# Patient Record
Sex: FEMALE | Race: OTHER | Employment: UNEMPLOYED | URBAN - METROPOLITAN AREA
[De-identification: names, ages, dates, MRNs, and addresses within clinical notes are randomized per-mention and may not be internally consistent; named-entity substitution may affect disease eponyms.]

---

## 2017-08-13 ENCOUNTER — HOSPITAL ENCOUNTER (EMERGENCY)
Facility: HOSPITAL | Age: 22
Discharge: HOME | End: 2017-08-13
Attending: EMERGENCY MEDICINE
Payer: COMMERCIAL

## 2017-08-13 VITALS
OXYGEN SATURATION: 99 % | RESPIRATION RATE: 16 BRPM | HEART RATE: 67 BPM | TEMPERATURE: 98 F | SYSTOLIC BLOOD PRESSURE: 107 MMHG | WEIGHT: 110 LBS | DIASTOLIC BLOOD PRESSURE: 71 MMHG

## 2017-08-13 DIAGNOSIS — R41.82 ALTERED MENTAL STATUS, UNSPECIFIED ALTERED MENTAL STATUS TYPE: Primary | ICD-10-CM

## 2017-08-13 LAB
ALBUMIN SERPL-MCNC: 4.2 G/DL (ref 3.5–4.8)
ALP LIVER SERPL-CCNC: 49 U/L (ref 52–144)
ALT SERPL-CCNC: 18 U/L (ref 14–54)
AMPHETAMINE URINE: NEGATIVE
AST SERPL-CCNC: 16 U/L (ref 15–41)
BARBITURATES URINE: NEGATIVE
BASOPHILS # BLD AUTO: 0.03 X10(3) UL (ref 0–0.1)
BASOPHILS NFR BLD AUTO: 0.3 %
BENZODIAZEPINES URINE: NEGATIVE
BILIRUB SERPL-MCNC: 0.4 MG/DL (ref 0.1–2)
BILIRUB UR QL STRIP.AUTO: NEGATIVE
BUN BLD-MCNC: 10 MG/DL (ref 8–20)
CALCIUM BLD-MCNC: 9.4 MG/DL (ref 8.3–10.3)
CANNABINOID URINE: NEGATIVE
CHLORIDE: 109 MMOL/L (ref 101–111)
CLARITY UR REFRACT.AUTO: CLEAR
CO2: 23 MMOL/L (ref 22–32)
COCAINE URINE: NEGATIVE
CREAT BLD-MCNC: 0.72 MG/DL (ref 0.55–1.02)
EOSINOPHIL # BLD AUTO: 0.04 X10(3) UL (ref 0–0.3)
EOSINOPHIL NFR BLD AUTO: 0.4 %
ERYTHROCYTE [DISTWIDTH] IN BLOOD BY AUTOMATED COUNT: 11.6 % (ref 11.5–16)
ETHYL ALCOHOL: <3 MG/DL (ref ?–3)
EXPIRATION DATE: NORMAL
GLUCOSE BLD-MCNC: 103 MG/DL (ref 70–99)
GLUCOSE UR STRIP.AUTO-MCNC: NEGATIVE MG/DL
HCT VFR BLD AUTO: 40.7 % (ref 34–50)
HGB BLD-MCNC: 14.2 G/DL (ref 12–16)
IMMATURE GRANULOCYTE COUNT: 0.03 X10(3) UL (ref 0–1)
IMMATURE GRANULOCYTE RATIO %: 0.3 %
LEUKOCYTE ESTERASE UR QL STRIP.AUTO: NEGATIVE
LYMPHOCYTES # BLD AUTO: 2.21 X10(3) UL (ref 0.9–4)
LYMPHOCYTES NFR BLD AUTO: 22 %
M PROTEIN MFR SERPL ELPH: 8 G/DL (ref 6.1–8.3)
MCH RBC QN AUTO: 32.6 PG (ref 27–33.2)
MCHC RBC AUTO-ENTMCNC: 34.9 G/DL (ref 31–37)
MCV RBC AUTO: 93.3 FL (ref 81–100)
MONOCYTES # BLD AUTO: 0.6 X10(3) UL (ref 0.1–0.6)
MONOCYTES NFR BLD AUTO: 6 %
NEUTROPHIL ABS PRELIM: 7.14 X10 (3) UL (ref 1.3–6.7)
NEUTROPHILS # BLD AUTO: 7.14 X10(3) UL (ref 1.3–6.7)
NEUTROPHILS NFR BLD AUTO: 71 %
NITRITE UR QL STRIP.AUTO: NEGATIVE
OPIATE URINE: NEGATIVE
PCP URINE: NEGATIVE
PH UR STRIP.AUTO: 8 [PH] (ref 4.5–8)
PLATELET # BLD AUTO: 228 10(3)UL (ref 150–450)
POCT LOT NUMBER: NORMAL
POCT URINE PREGNANCY: NEGATIVE
POTASSIUM SERPL-SCNC: 3.5 MMOL/L (ref 3.6–5.1)
PROT UR STRIP.AUTO-MCNC: NEGATIVE MG/DL
RBC # BLD AUTO: 4.36 X10(6)UL (ref 3.8–5.1)
RBC UR QL AUTO: NEGATIVE
RED CELL DISTRIBUTION WIDTH-SD: 39.1 FL (ref 35.1–46.3)
SODIUM SERPL-SCNC: 142 MMOL/L (ref 136–144)
SP GR UR STRIP.AUTO: 1.01 (ref 1–1.03)
TROPONIN: <0.046 NG/ML (ref ?–0.05)
UROBILINOGEN UR STRIP.AUTO-MCNC: <2 MG/DL
WBC # BLD AUTO: 10.1 X10(3) UL (ref 4–13)

## 2017-08-13 PROCEDURE — 81003 URINALYSIS AUTO W/O SCOPE: CPT | Performed by: EMERGENCY MEDICINE

## 2017-08-13 PROCEDURE — 80053 COMPREHEN METABOLIC PANEL: CPT | Performed by: EMERGENCY MEDICINE

## 2017-08-13 PROCEDURE — 80320 DRUG SCREEN QUANTALCOHOLS: CPT | Performed by: EMERGENCY MEDICINE

## 2017-08-13 PROCEDURE — 99284 EMERGENCY DEPT VISIT MOD MDM: CPT

## 2017-08-13 PROCEDURE — 93010 ELECTROCARDIOGRAM REPORT: CPT

## 2017-08-13 PROCEDURE — 80307 DRUG TEST PRSMV CHEM ANLYZR: CPT | Performed by: EMERGENCY MEDICINE

## 2017-08-13 PROCEDURE — 84484 ASSAY OF TROPONIN QUANT: CPT | Performed by: EMERGENCY MEDICINE

## 2017-08-13 PROCEDURE — 93005 ELECTROCARDIOGRAM TRACING: CPT

## 2017-08-13 PROCEDURE — 85025 COMPLETE CBC W/AUTO DIFF WBC: CPT | Performed by: EMERGENCY MEDICINE

## 2017-08-13 PROCEDURE — 96360 HYDRATION IV INFUSION INIT: CPT

## 2017-08-13 PROCEDURE — 81025 URINE PREGNANCY TEST: CPT

## 2017-08-13 NOTE — ED NOTES
Family at the bedside, patient alert and communicating with family and staff.  She states \" I fell asleep and woke up numb, my mouth was numb so I couldn't talk\"

## 2017-08-13 NOTE — ED PROVIDER NOTES
Patient Seen in: [facility] Emergency Department    History   Patient presents with:  Numbness Weakness (neurologic)    Stated Complaint: weakness    HPI    The patient is a 26-year-old female brought in by her family due to \"crumpling up on the fl as noted above. PSFH elements reviewed from today and agreed except as otherwise stated in HPI.     Physical Exam   ED Triage Vitals [08/13/17 0223]  BP: 129/78  Pulse: 98  Resp: 22  Temp: 98.1 °F (36.7 °C)  Temp src: Temporal  SpO2: 99 %  O2 Device: Non normal limits   ETHYL ALCOHOL - Normal   DRUG SCREEN 7 W/OUT CONFIRMATION, URINE - Normal    Narrative:     Results of the Urine Drug Screen should be used only for medical purposes.    TROPONIN I - Normal   CBC WITH DIFFERENTIAL WITH PLATELET    Narrative: inversions however she is not having any chest pain or shortness of breath. Due to the T-wave inversions troponin was added on. The remainder of her blood work is reassuring.   She is observed in the emergency department for a couple of hours, and now she

## 2017-08-14 LAB
ATRIAL RATE: 86 BPM
P AXIS: 73 DEGREES
P-R INTERVAL: 128 MS
Q-T INTERVAL: 348 MS
QRS DURATION: 78 MS
QTC CALCULATION (BEZET): 416 MS
R AXIS: 29 DEGREES
T AXIS: 13 DEGREES
VENTRICULAR RATE: 86 BPM

## 2018-12-17 ENCOUNTER — OFFICE VISIT (OUTPATIENT)
Dept: FAMILY MEDICINE CLINIC | Facility: CLINIC | Age: 23
End: 2018-12-17
Payer: COMMERCIAL

## 2018-12-17 VITALS
BODY MASS INDEX: 24.55 KG/M2 | DIASTOLIC BLOOD PRESSURE: 70 MMHG | SYSTOLIC BLOOD PRESSURE: 110 MMHG | TEMPERATURE: 99 F | HEART RATE: 84 BPM | WEIGHT: 128.38 LBS | OXYGEN SATURATION: 97 % | HEIGHT: 60.63 IN

## 2018-12-17 DIAGNOSIS — R10.13 EPIGASTRIC PAIN: ICD-10-CM

## 2018-12-17 DIAGNOSIS — R20.2 NUMBNESS AND TINGLING OF BOTH LEGS: ICD-10-CM

## 2018-12-17 DIAGNOSIS — R51.9 HEADACHE DISORDER: ICD-10-CM

## 2018-12-17 DIAGNOSIS — R20.2 NUMBNESS AND TINGLING IN BOTH HANDS: Primary | ICD-10-CM

## 2018-12-17 DIAGNOSIS — R20.0 NUMBNESS AND TINGLING OF BOTH LEGS: ICD-10-CM

## 2018-12-17 DIAGNOSIS — R20.0 NUMBNESS AND TINGLING IN BOTH HANDS: Primary | ICD-10-CM

## 2018-12-17 PROCEDURE — 99204 OFFICE O/P NEW MOD 45 MIN: CPT | Performed by: FAMILY MEDICINE

## 2018-12-17 NOTE — PROGRESS NOTES
Ana Gordillo is a 26year old female here for Patient presents with:  Numbness: Leg and arm numbness x 2 weeks       HPI:       1. Numbness and tingling in both hands  2.  Numbness and tingling of both legs  -started 1 yr ago  -most recently, had another ep murmurs  Abd: soft, ND, NT, +BS  Ext: full ROM  Psych: normal affect  Neuro: CN II-XII intact, strength and sensation normal, normal gait, normal reflexes         ASSESSMENT/PLAN:     1. Numbness and tingling in both hands  2.  Numbness and tingling of both

## 2018-12-17 NOTE — PATIENT INSTRUCTIONS
-- start otc probiotics once a day for 1-2 months  -- - try generic ranitidine (otc) 30min before dinner (if symptoms during the day, can take medication 2x/day before breakfast and dinner)  --limit caffeine, spicy foods, alcohol, acidic foods (tomatoes,

## 2018-12-20 ENCOUNTER — LAB ENCOUNTER (OUTPATIENT)
Dept: LAB | Age: 23
End: 2018-12-20
Attending: FAMILY MEDICINE
Payer: COMMERCIAL

## 2018-12-20 DIAGNOSIS — R20.2 NUMBNESS AND TINGLING OF BOTH LEGS: ICD-10-CM

## 2018-12-20 DIAGNOSIS — R20.2 NUMBNESS AND TINGLING IN BOTH HANDS: ICD-10-CM

## 2018-12-20 DIAGNOSIS — R20.0 NUMBNESS AND TINGLING OF BOTH LEGS: ICD-10-CM

## 2018-12-20 DIAGNOSIS — R20.0 NUMBNESS AND TINGLING IN BOTH HANDS: ICD-10-CM

## 2018-12-20 PROCEDURE — 82607 VITAMIN B-12: CPT

## 2018-12-20 PROCEDURE — 82746 ASSAY OF FOLIC ACID SERUM: CPT

## 2018-12-20 PROCEDURE — 36415 COLL VENOUS BLD VENIPUNCTURE: CPT

## 2018-12-20 PROCEDURE — 80053 COMPREHEN METABOLIC PANEL: CPT

## 2018-12-20 PROCEDURE — 85025 COMPLETE CBC W/AUTO DIFF WBC: CPT

## 2018-12-20 PROCEDURE — 84443 ASSAY THYROID STIM HORMONE: CPT

## 2018-12-20 PROCEDURE — 82306 VITAMIN D 25 HYDROXY: CPT

## 2018-12-27 ENCOUNTER — TELEPHONE (OUTPATIENT)
Dept: FAMILY MEDICINE CLINIC | Facility: CLINIC | Age: 23
End: 2018-12-27

## 2018-12-27 DIAGNOSIS — E55.9 VITAMIN D DEFICIENCY: Primary | ICD-10-CM

## 2018-12-27 RX ORDER — ERGOCALCIFEROL 1.25 MG/1
50000 CAPSULE ORAL WEEKLY
Qty: 12 CAPSULE | Refills: 0 | Status: SHIPPED | OUTPATIENT
Start: 2018-12-27 | End: 2019-03-08

## 2019-01-07 ENCOUNTER — OFFICE VISIT (OUTPATIENT)
Dept: FAMILY MEDICINE CLINIC | Facility: CLINIC | Age: 24
End: 2019-01-07
Payer: COMMERCIAL

## 2019-01-07 VITALS
TEMPERATURE: 99 F | SYSTOLIC BLOOD PRESSURE: 120 MMHG | HEART RATE: 84 BPM | BODY MASS INDEX: 23.48 KG/M2 | WEIGHT: 126 LBS | HEIGHT: 61.42 IN | DIASTOLIC BLOOD PRESSURE: 78 MMHG

## 2019-01-07 DIAGNOSIS — E55.9 VITAMIN D DEFICIENCY: ICD-10-CM

## 2019-01-07 DIAGNOSIS — R10.13 EPIGASTRIC PAIN: ICD-10-CM

## 2019-01-07 DIAGNOSIS — R51.9 HEADACHE DISORDER: Primary | ICD-10-CM

## 2019-01-07 PROCEDURE — 99215 OFFICE O/P EST HI 40 MIN: CPT | Performed by: FAMILY MEDICINE

## 2019-01-07 RX ORDER — AMITRIPTYLINE HYDROCHLORIDE 25 MG/1
TABLET, FILM COATED ORAL
Qty: 30 TABLET | Refills: 2 | Status: SHIPPED | OUTPATIENT
Start: 2019-01-07 | End: 2019-02-15

## 2019-01-07 NOTE — PATIENT INSTRUCTIONS
-- start amitripytline 1/2 tab every night - if doing ok, can increase to 1 tab every night  -- continue probiotics once daily  -- continue vit D 1x/wk  -- followup with neurology next week as planned  -- come back here in 1 month

## 2019-02-15 ENCOUNTER — OFFICE VISIT (OUTPATIENT)
Dept: FAMILY MEDICINE CLINIC | Facility: CLINIC | Age: 24
End: 2019-02-15
Payer: COMMERCIAL

## 2019-02-15 VITALS
TEMPERATURE: 98 F | HEIGHT: 61.42 IN | DIASTOLIC BLOOD PRESSURE: 60 MMHG | SYSTOLIC BLOOD PRESSURE: 100 MMHG | WEIGHT: 123.13 LBS | OXYGEN SATURATION: 97 % | BODY MASS INDEX: 22.95 KG/M2 | HEART RATE: 86 BPM

## 2019-02-15 DIAGNOSIS — Z3A.01 LESS THAN 8 WEEKS GESTATION OF PREGNANCY: Primary | ICD-10-CM

## 2019-02-15 PROCEDURE — 99215 OFFICE O/P EST HI 40 MIN: CPT | Performed by: FAMILY MEDICINE

## 2019-02-15 NOTE — PROGRESS NOTES
Vidya High is a 26year old female here for Patient presents with:  Pregnancy: Patient took a pregnancy test at home yesterday with a positive result.  Patient wants Blood Pregnancy test.      HPI:     ? Pregnancy  LMP 1/11/19  [facility]  -f/u in 1 month, sooner prn    - OBG - INTERNAL          The patient is asked to return in 1 month. Orders This Visit:  No orders of the defined types were placed in this encounter.       Meds This Visit:  Requested Prescriptions      No pres

## 2019-02-15 NOTE — PATIENT INSTRUCTIONS
-- stop prescription vit D - start otc vit D 2000 units daily  -- start prenatal vitamins (take with food, at night)  -- consider talking to family  -- continue to talk to boyfriend    -- call to schedule appt with obgyn     -- if change your mind, con

## 2019-02-18 ENCOUNTER — TELEPHONE (OUTPATIENT)
Dept: OBGYN CLINIC | Facility: CLINIC | Age: 24
End: 2019-02-18

## 2019-02-18 NOTE — TELEPHONE ENCOUNTER
Called patient back; ; no significant history. Pt taking PNV; no other meds. Pt reports left sided chest pain x 3 days; rates 5/10. Pt denies SOB or recent illness. Pt advised to go to ER/Urgent care or PCP today. Patient verbalized understanding.

## 2019-02-18 NOTE — TELEPHONE ENCOUNTER
Patient calling to initiate prenatal care  LMP = 1/11/19  Patient is 7-8 weeks =3/1/19  Confirmation Ultrasound and Appointment scheduled on 3/8/19  Insurance Mary Brandon time to return phone call = anytime   Future Appointments   Date Time Pro

## 2019-03-07 NOTE — PROGRESS NOTES
[facility]  Obstetrics and Gynecology    Subjective:     Azalea Mcgregor is a 26year old  female presents with c/o secondary amenorrhea and positive pregnancy test. The patient was recommended to return for further evaluation.  The patient re advised to follow up to establish prenatal care   - SAB precautions provided   - d/w nausea and vomiting in pregnancy including vitamin B 6 and unisom     All of the findings and plan were discussed with the patient.   She notes understanding and agrees wit

## 2019-03-08 ENCOUNTER — ULTRASOUND ENCOUNTER (OUTPATIENT)
Dept: OBGYN CLINIC | Facility: CLINIC | Age: 24
End: 2019-03-08
Payer: COMMERCIAL

## 2019-03-08 ENCOUNTER — OFFICE VISIT (OUTPATIENT)
Dept: OBGYN CLINIC | Facility: CLINIC | Age: 24
End: 2019-03-08
Payer: COMMERCIAL

## 2019-03-08 VITALS
HEIGHT: 61 IN | SYSTOLIC BLOOD PRESSURE: 114 MMHG | BODY MASS INDEX: 22.69 KG/M2 | WEIGHT: 120.19 LBS | DIASTOLIC BLOOD PRESSURE: 64 MMHG

## 2019-03-08 DIAGNOSIS — N91.2 AMENORRHEA: ICD-10-CM

## 2019-03-08 DIAGNOSIS — Z32.01 PREGNANCY EXAMINATION OR TEST, POSITIVE RESULT: Primary | ICD-10-CM

## 2019-03-08 PROCEDURE — 99203 OFFICE O/P NEW LOW 30 MIN: CPT | Performed by: OBSTETRICS & GYNECOLOGY

## 2019-03-08 PROCEDURE — 76801 OB US < 14 WKS SINGLE FETUS: CPT | Performed by: OBSTETRICS & GYNECOLOGY

## 2019-03-08 RX ORDER — PRENATAL VIT/IRON FUM/FOLIC AC 27MG-0.8MG
1 TABLET ORAL DAILY
COMMUNITY
End: 2021-01-11 | Stop reason: ALTCHOICE

## 2019-03-08 NOTE — PATIENT INSTRUCTIONS
Genetic screening     1) First trimester screening at 13 weeks includes blood test and ultrasound. You will need to schedule an appointment with maternal fetal medicine office. Therefore, you will need time to make appointment.      2) cell free fetal DNA ( Headache or Mild aches and pain: Extra Strength Tylenol     Gas: Gas X, Gelusil, Papaya Tablets, Maalox, Mylicon or Mylanta Gas    Heartburn: Tums, Mylanta, Pepcid AC, Zantac 75 or Maalox    Sore throat: Alcohol free Chloraseptic spray or Lozenges     Shelbieus

## 2019-03-15 ENCOUNTER — TELEPHONE (OUTPATIENT)
Dept: OBGYN CLINIC | Facility: CLINIC | Age: 24
End: 2019-03-15

## 2019-03-15 NOTE — TELEPHONE ENCOUNTER
Spoke with sister of patient. Advised her we messaged her sister earlier this morning with an available appt for today. She did not message back as of yet. Offered appt at 2:00 with Sasha Moore in [place] today. Sister of pt states that office is too far.  Flora Zheng

## 2019-03-15 NOTE — TELEPHONE ENCOUNTER
Pt sister called [place], her sister is ob and has her new ob appt ob wed, 3/27 at 11:30 with [facility]. She is worried about her sister, she is hardly not eating at all, always throwing up all day long, no energy at all and sleeping a lot.

## 2019-03-26 ENCOUNTER — PATIENT MESSAGE (OUTPATIENT)
Dept: OBGYN CLINIC | Facility: CLINIC | Age: 24
End: 2019-03-26

## 2019-03-26 RX ORDER — METOCLOPRAMIDE 10 MG/1
10 TABLET ORAL EVERY 6 HOURS PRN
Qty: 20 TABLET | Refills: 0 | Status: SHIPPED | OUTPATIENT
Start: 2019-03-26 | End: 2019-04-01

## 2019-03-26 NOTE — TELEPHONE ENCOUNTER
Patient was seen for  on 03/08/19. At that time she had c/o N/V and was advised to use OTC Vitamin B6 and Unisom. Patient has been taking since that time with no relief. She vomits multiple times and is unable to keep fluids down.  Reports some dizziness

## 2019-03-26 NOTE — TELEPHONE ENCOUNTER
From: Vidya High  To: Mariana Good MD  Sent: 3/26/2019 1:02 PM CDT  Subject: Other    Yes sandrine been taking them but they didnt help. And no i can't keep fluids down. And there is days sandrine been dizzy.

## 2019-03-27 ENCOUNTER — TELEPHONE (OUTPATIENT)
Dept: OBGYN CLINIC | Facility: CLINIC | Age: 24
End: 2019-03-27

## 2019-04-01 ENCOUNTER — TELEPHONE (OUTPATIENT)
Dept: OBGYN CLINIC | Facility: CLINIC | Age: 24
End: 2019-04-01

## 2019-04-01 ENCOUNTER — APPOINTMENT (OUTPATIENT)
Dept: ULTRASOUND IMAGING | Facility: HOSPITAL | Age: 24
End: 2019-04-01
Attending: EMERGENCY MEDICINE
Payer: COMMERCIAL

## 2019-04-01 ENCOUNTER — HOSPITAL ENCOUNTER (EMERGENCY)
Facility: HOSPITAL | Age: 24
Discharge: HOME | End: 2019-04-01
Attending: EMERGENCY MEDICINE
Payer: COMMERCIAL

## 2019-04-01 VITALS
SYSTOLIC BLOOD PRESSURE: 105 MMHG | HEIGHT: 61 IN | RESPIRATION RATE: 18 BRPM | WEIGHT: 108 LBS | HEART RATE: 91 BPM | DIASTOLIC BLOOD PRESSURE: 73 MMHG | OXYGEN SATURATION: 99 % | BODY MASS INDEX: 20.39 KG/M2 | TEMPERATURE: 98 F

## 2019-04-01 DIAGNOSIS — O21.0 HYPEREMESIS GRAVIDARUM: Primary | ICD-10-CM

## 2019-04-01 PROCEDURE — 76700 US EXAM ABDOM COMPLETE: CPT | Performed by: EMERGENCY MEDICINE

## 2019-04-01 RX ORDER — METOCLOPRAMIDE HYDROCHLORIDE 5 MG/ML
5 INJECTION INTRAMUSCULAR; INTRAVENOUS ONCE
Status: COMPLETED | OUTPATIENT
Start: 2019-04-01 | End: 2019-04-01

## 2019-04-01 RX ORDER — SODIUM CHLORIDE 9 MG/ML
1000 INJECTION, SOLUTION INTRAVENOUS ONCE
Status: COMPLETED | OUTPATIENT
Start: 2019-04-01 | End: 2019-04-01

## 2019-04-01 RX ORDER — DEXTROSE AND SODIUM CHLORIDE 5; .9 G/100ML; G/100ML
INJECTION, SOLUTION INTRAVENOUS ONCE
Status: COMPLETED | OUTPATIENT
Start: 2019-04-01 | End: 2019-04-01

## 2019-04-01 RX ORDER — METOCLOPRAMIDE 10 MG/1
5 TABLET ORAL EVERY 6 HOURS PRN
Qty: 16 TABLET | Refills: 0 | Status: SHIPPED | OUTPATIENT
Start: 2019-04-01 | End: 2019-04-17

## 2019-04-01 RX ORDER — DIPHENHYDRAMINE HYDROCHLORIDE 50 MG/ML
25 INJECTION INTRAMUSCULAR; INTRAVENOUS ONCE
Status: COMPLETED | OUTPATIENT
Start: 2019-04-01 | End: 2019-04-01

## 2019-04-01 NOTE — ED NOTES
Pt states the pain she has now she has had for a week, was told at [facility] something was wrong with her gallbladder but she is unsure what. This is the same pain she had when she was admitted there recently.

## 2019-04-01 NOTE — TELEPHONE ENCOUNTER
Pt's sister is calling on behalf of pt  Sister state that the pt still can not keep anything down and is too weak to answer the phone to speak with anyone that is why sister is calling  We have no FYI on file for pt so not sure if this is ok to speak with

## 2019-04-01 NOTE — TELEPHONE ENCOUNTER
Spoke with patient. She states she has had nausea and vomiting she stated for 4 weeks. She is not able to keep any fluids down at all. She sounds very weak/soft on the phone. Also with dizziness.  She was in ER on Friday at [facility] and they gave her Reg

## 2019-04-01 NOTE — TELEPHONE ENCOUNTER
Cortes Billingsley rooming and cannot take call/all other nurses on phone/PT waiting on hold till nurse is available in another office

## 2019-04-01 NOTE — ED INITIAL ASSESSMENT (HPI)
Pt to ED with NV x a month. Pt is 11 weeks pregnant, has not yet seen OB. Pt states she hasn't kept much PO intake down since early March. Denies vaginal bleeding + right sided abd pain.

## 2019-04-01 NOTE — ED NOTES
Pt states she is unable to provide urine specimen at this time. Pt states she was at [facility] the other day for same complaints, admitted for 2 days.

## 2019-04-01 NOTE — TELEPHONE ENCOUNTER
G1/P 0 GA 11 3/7 wks by LMP of 1/11/19 patient. Last OV: 3/8/19 for confirmation of pregnancy appt with Dr. Karlos Aguirre   Pregnancy Complications: none      Call to emergency contact- pt's sister, [place].  She is not with patient currently; states she is conc

## 2019-04-02 ENCOUNTER — TELEPHONE (OUTPATIENT)
Dept: OBGYN CLINIC | Facility: CLINIC | Age: 24
End: 2019-04-02

## 2019-04-02 NOTE — TELEPHONE ENCOUNTER
Contacted patient regarding recent ER visit. Patient states she is not feeling any better. She has been seen in the ER twice (once at [facility]) in the last week. She is still not able to keep anything down.  She is taking Benadryl + Reglan combination bu

## 2019-04-02 NOTE — ED PROVIDER NOTES
Patient Seen in: [facility] Emergency Department    History   Patient presents with:  Nausea/Vomiting/Diarrhea (gastrointestinal)    Stated Complaint: nvdr    HPI    Patient is a 27-year-old female with nausea vomiting associate with early pregnancy rate and rhythm. Abdomen: Benign abdominal exam.  No focal tenderness. Soft  Extremities: No clubbing/cyanosis/edema. Skin: No rashes, no pallor  Neuro: Awake oriented ×3.   Nonfocal.  Good strength throughout    ED Course     Labs Reviewed   COMP METABO saline. Initial bicarb 13. Glucose of 80. A second liter was given. Was given Reglan and Benadryl. No vomiting here. Does have mildly elevated LFTs. Abdominal ultrasound shows gallbladder sludge without cholecystitis.     MDM   Patient received 2 L o

## 2019-04-02 NOTE — TELEPHONE ENCOUNTER
Advise to increase Reglan to 10 mg PO q 6 hours. Advise unisom 12.5 mg qhs and vitamin b6 100 mg PO daily. Advise small, frequent meals. Advise dry foods, non-diary. Sea bands - has to wear both bands. Delmis - chew, hard candy.    If not improving th

## 2019-04-02 NOTE — ED NOTES
Pt states she is feeling better, nausea subsided. 2nd bag fluids infusing wide per order. Pt aware awaiting US.

## 2019-04-02 NOTE — ED NOTES
Bedside report from Carrillo Soni RN.  Pt resting, she denies pain, denies n/v. Pt awaiting US, she denies need, call light in reach

## 2019-04-02 NOTE — ED NOTES
Pt returned from [address], she denies pain.  Pt ambulated to the bathroom, gait steady

## 2019-04-02 NOTE — ED NOTES
Report to Jossy Alex at this time. US now ready. Pt resting on cart, still unable to provide urine specimen.

## 2019-04-03 ENCOUNTER — HOSPITAL ENCOUNTER (INPATIENT)
Facility: HOSPITAL | Age: 24
LOS: 6 days | Discharge: HOME HEALTH | DRG: 832 | End: 2019-04-11
Attending: EMERGENCY MEDICINE | Admitting: HOSPITALIST
Payer: COMMERCIAL

## 2019-04-03 DIAGNOSIS — R79.89 ELEVATED LFTS: ICD-10-CM

## 2019-04-03 DIAGNOSIS — O21.0 HYPEREMESIS GRAVIDARUM: Primary | ICD-10-CM

## 2019-04-03 PROBLEM — E87.6 HYPOKALEMIA: Status: ACTIVE | Noted: 2019-04-03

## 2019-04-03 PROBLEM — E87.20 METABOLIC ACIDOSIS: Status: ACTIVE | Noted: 2019-04-03

## 2019-04-03 PROBLEM — E87.2 METABOLIC ACIDOSIS: Status: ACTIVE | Noted: 2019-04-03

## 2019-04-03 RX ORDER — DEXTROSE AND SODIUM CHLORIDE 5; .45 G/100ML; G/100ML
INJECTION, SOLUTION INTRAVENOUS ONCE
Status: COMPLETED | OUTPATIENT
Start: 2019-04-03 | End: 2019-04-03

## 2019-04-03 RX ORDER — METOCLOPRAMIDE HYDROCHLORIDE 5 MG/ML
5 INJECTION INTRAMUSCULAR; INTRAVENOUS ONCE
Status: COMPLETED | OUTPATIENT
Start: 2019-04-03 | End: 2019-04-03

## 2019-04-03 RX ORDER — DIPHENHYDRAMINE HYDROCHLORIDE 50 MG/ML
25 INJECTION INTRAMUSCULAR; INTRAVENOUS ONCE
Status: COMPLETED | OUTPATIENT
Start: 2019-04-03 | End: 2019-04-03

## 2019-04-04 PROCEDURE — 99220 INITIAL OBSERVATION CARE,LEVL III: CPT | Performed by: HOSPITALIST

## 2019-04-04 PROCEDURE — 90792 PSYCH DIAG EVAL W/MED SRVCS: CPT | Performed by: OTHER

## 2019-04-04 PROCEDURE — 99252 IP/OBS CONSLTJ NEW/EST SF 35: CPT | Performed by: OBSTETRICS & GYNECOLOGY

## 2019-04-04 RX ORDER — METOCLOPRAMIDE HYDROCHLORIDE 5 MG/ML
10 INJECTION INTRAMUSCULAR; INTRAVENOUS EVERY 8 HOURS PRN
Status: DISCONTINUED | OUTPATIENT
Start: 2019-04-04 | End: 2019-04-11

## 2019-04-04 RX ORDER — ONDANSETRON 4 MG/1
4 TABLET, ORALLY DISINTEGRATING ORAL EVERY 4 HOURS PRN
Qty: 30 TABLET | Refills: 0 | Status: SHIPPED | OUTPATIENT
Start: 2019-04-04 | End: 2019-04-26

## 2019-04-04 RX ORDER — ONDANSETRON 2 MG/ML
4 INJECTION INTRAMUSCULAR; INTRAVENOUS EVERY 6 HOURS PRN
Status: DISCONTINUED | OUTPATIENT
Start: 2019-04-04 | End: 2019-04-04

## 2019-04-04 RX ORDER — DEXTROSE AND SODIUM CHLORIDE 5; .9 G/100ML; G/100ML
INJECTION, SOLUTION INTRAVENOUS CONTINUOUS
Status: DISCONTINUED | OUTPATIENT
Start: 2019-04-04 | End: 2019-04-05

## 2019-04-04 RX ORDER — ONDANSETRON 2 MG/ML
4 INJECTION INTRAMUSCULAR; INTRAVENOUS EVERY 6 HOURS PRN
Status: DISCONTINUED | OUTPATIENT
Start: 2019-04-04 | End: 2019-04-05

## 2019-04-04 NOTE — PROGRESS NOTES
Hospitalist Follow-up Note    Patient seen and examined. Patient's nausea slightly improved. Still not interested in eating. Patient reports last time she ate was 4 weeks ago. Patient denies feeling depressed/anxious. Feels safe at home.  While patient may

## 2019-04-04 NOTE — PLAN OF CARE
GASTROINTESTINAL - ADULT    • Minimal or absence of nausea and vomiting Progressing        PT ADMITTED ALERT, ACCOMPANIED BY FAMILY, REFUSING TO TALK, HER SISTER STATES SHE HAS STOPPED TALKING 2 WEEKS AGO, 100% ON RA, GIVEN ZOFRAN,  IVF INFUSING, LABS IN A

## 2019-04-04 NOTE — H&P
[facility]  History and Physical     Verdie Son Patient Status:  Observation    1995 MRN [id number]   [facility] 0SW-A Attending Jaquelin King MD   Hosp Day # 0 PCP Torsten Foster MD     Chief Complaint: hyperemesis    Hi x 3.  HEENT: Normocephalic atraumatic. Moist mucous membranes. EOM-I. PERRLA. Anicteric. Neck: No lymphadenopathy. No JVD. No carotid bruits. Respiratory: Clear to auscultation bilaterally. No wheezes. No rhonchi.   Cardiovascular: S1, S2. Regular rate an

## 2019-04-04 NOTE — ED PROVIDER NOTES
Patient Seen in: [facility] Emergency Department    History   Patient presents with:  Nausea/Vomiting/Diarrhea (gastrointestinal)    Stated Complaint: hyperemesis, almost 3 mos pregnant    HPI    Anjlai Schlatter is a 31-year-old female who presents today for atraumatic. Right Ear: External ear normal.   Left Ear: External ear normal.   Nose: Nose normal.   Mouth/Throat: Oropharynx is clear and moist.   Eyes: Conjunctivae and EOM are normal. Pupils are equal, round, and reactive to light.    Cardiovascular: No gravidarum  Plan: This is the patient's second visit to the emergency department in the past 48 hours. She will likely need admission for continued IV fluid treatment.   Dr. Corrine Qureshi is assuming care and will determine final disposition based on the labs t

## 2019-04-04 NOTE — CONSULTS
[facility]  Obstetrics and Gynecology Consultation     Ana Gordillo Patient Status:  Observation    1995 MRN [id number]   [facility] 0SW-A Attending Danelle Huang, [address] [place] [address],[address] Day 0 PCP Ashwini Ruiz MD     Reason for Con Transportation needs:        Medical: Not on file        Non-medical: Not on file    Tobacco Use      Smoking status: Never Smoker      Smokeless tobacco: Never Used    Substance and Sexual Activity      Alcohol use: No        Frequency: Never      Drug us denies history of excessive bleeding or bruising, denies dizziness, lightheadedness.    Breast:  denies rashes, skin changes, pain, lumps or discharge   Psychiatric:  denies depression, changes in sleep patterns, anxiety  Endocrine: denies hot or cold into pounds)  She may be a candidate for PICC line and parenteral nutrition. Recommend daily weights and with feeling better, may try to slowly increase diet. Pt needs to have her new ob visit with us.     All of the findings and plan were discussed with t

## 2019-04-04 NOTE — DIETARY MALNUTRITION NOTE
[facility]    NUTRITION INITIAL ASSESSMENT    Pt meets severe malnutrition criteria.     CRITERIA FOR MALNUTRITION DIAGNOSIS:  Criteria for severe malnutrition diagnosis: chronic illness related to wt loss greater than 7.5% in 3 months and energy intak <25%  Intake Meeting Needs: No  Food Allergies: No  Cultural/Ethnic/Gnosticist Preferences Addresses: Yes    NUTRITION RELATED PHYSICAL FINDINGS:     1. Body Fat/Muscle Mass: BMI 20.41; unable to perform NFPA     2.  Fluid Accumulation: none per documentatio
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
## 2019-04-04 NOTE — PLAN OF CARE
GASTROINTESTINAL - ADULT    • Minimal or absence of nausea and vomiting Progressing    • Maintains adequate nutritional intake (undernourished) Progressing        GENITOURINARY - ADULT    • Absence of urinary retention Progressing        METABOLIC/FLUID AN

## 2019-04-04 NOTE — PLAN OF CARE
Assumed patient care 0700 per day shift, a/o x4  No c/o of pain or nausea at this time. Liquids and diet encouraged. Took minimal sips of water, declines meals at this time. IVF infusing  K low this AM, Hospitalist and OB notified, replaced per order.

## 2019-04-04 NOTE — PROGRESS NOTES
PSYCH CONSULT    Date of Admission: 4/3/19  Date of Consult: 4/4/19  Reason for Consultation: Eval for depression    Impression:  Primary Psychiatric Diagnoses:  Major depressive disorder, single episode, severe, without psychotic features.      Hx non-epil

## 2019-04-04 NOTE — TELEPHONE ENCOUNTER
Left message for patient to call back. Patient was admitted to hospital last night. Note stated she is under care of Dr. Keily Lieberman? Encounter closed since 3rd attempt.

## 2019-04-05 ENCOUNTER — APPOINTMENT (OUTPATIENT)
Dept: GENERAL RADIOLOGY | Facility: HOSPITAL | Age: 24
DRG: 832 | End: 2019-04-05
Attending: STUDENT IN AN ORGANIZED HEALTH CARE EDUCATION/TRAINING PROGRAM
Payer: COMMERCIAL

## 2019-04-05 ENCOUNTER — APPOINTMENT (OUTPATIENT)
Dept: GENERAL RADIOLOGY | Facility: HOSPITAL | Age: 24
DRG: 832 | End: 2019-04-05
Attending: NURSE PRACTITIONER
Payer: COMMERCIAL

## 2019-04-05 PROCEDURE — 0DH67UZ INSERTION OF FEEDING DEVICE INTO STOMACH, VIA NATURAL OR ARTIFICIAL OPENING: ICD-10-PCS | Performed by: HOSPITALIST

## 2019-04-05 PROCEDURE — 99232 SBSQ HOSP IP/OBS MODERATE 35: CPT | Performed by: OTHER

## 2019-04-05 PROCEDURE — 99233 SBSQ HOSP IP/OBS HIGH 50: CPT | Performed by: HOSPITALIST

## 2019-04-05 PROCEDURE — 71045 X-RAY EXAM CHEST 1 VIEW: CPT | Performed by: STUDENT IN AN ORGANIZED HEALTH CARE EDUCATION/TRAINING PROGRAM

## 2019-04-05 RX ORDER — SODIUM CHLORIDE 9 MG/ML
INJECTION, SOLUTION INTRAVENOUS CONTINUOUS
Status: DISCONTINUED | OUTPATIENT
Start: 2019-04-05 | End: 2019-04-06

## 2019-04-05 RX ORDER — SERTRALINE HYDROCHLORIDE 20 MG/ML
25 SOLUTION ORAL DAILY
Status: DISCONTINUED | OUTPATIENT
Start: 2019-04-05 | End: 2019-04-09

## 2019-04-05 RX ORDER — MELATONIN
100 DAILY
Status: DISCONTINUED | OUTPATIENT
Start: 2019-04-05 | End: 2019-04-07

## 2019-04-05 RX ORDER — POTASSIUM CHLORIDE 14.9 MG/ML
20 INJECTION INTRAVENOUS ONCE
Status: COMPLETED | OUTPATIENT
Start: 2019-04-05 | End: 2019-04-05

## 2019-04-05 RX ORDER — ONDANSETRON 2 MG/ML
4 INJECTION INTRAMUSCULAR; INTRAVENOUS EVERY 6 HOURS
Status: DISCONTINUED | OUTPATIENT
Start: 2019-04-05 | End: 2019-04-09

## 2019-04-05 NOTE — PLAN OF CARE
PSYCH ADDENDUM:  She agreed to have the dophoff placed, but will not allow the CXR to be done. It was explained that there would be a cover to protect the baby from the [address]. She refused to have the Xray to check the position of the dophoff.  I asked her t

## 2019-04-05 NOTE — PLAN OF CARE
Assumed care of patient around 0730, alert and oriented X4, no c/o CP/SOB, SpO2 99 % on RA    Not being verbal much with staff, very drowsy but responds to voice    Dr Jose Francisco Klein seen patient- GI consult placed for possible dobhoff placement for tube feeds.

## 2019-04-05 NOTE — PLAN OF CARE
Patient can be moved to the behavioral med area in 3-CTU since she will start tube feeds and need monitoring for refeeding syndrome.      Dr. Ira Choudhary

## 2019-04-05 NOTE — PROGRESS NOTES
OB PN  Pt is a 25 yo  at 12w0d by LMP who was admitted on 4/3/19 for severe hyperemesis. Pt is non verbal, appears to have altered mental status, does follow commands and nods in affirmation to questions.  Sister is at bedside and history is obtained

## 2019-04-05 NOTE — DIETARY NOTE
Nutrition Services- Enteral nutrition   Consult received for enteral nutrition recommendations. Recommend initiating enteral nutrition at reduced rate of Jevity 1.5 at 10 ml/hour and increasing by 10 ml q 12 hours to goal  of Jevity 1.5 at 50 ml/hour.  Brian

## 2019-04-05 NOTE — PAYOR COMM NOTE
--------------  ADMISSION REVIEW     Payor: [facility] PPO  Subscriber #:  [id number]  Authorization Number: N/A    Admit date: 4/5/19  Admit time: 4079       Admitting Physician: Jayla Mohr MD  Attending Physician:  Otto Garcia MD  [facility] as noted in HPI. Constitutional and vital signs reviewed. All other systems reviewed and negative except as noted above.     Physical Exam     ED Triage Vitals [04/03/19 1924]   /71   Pulse 119   Resp 18   Temp 97.7 °F (36.5 °C)   Temp src Tempo RAINBOW DRAW BLUE   RAINBOW DRAW LAVENDER   RAINBOW DRAW LIGHT GREEN   RAINBOW DRAW GOLD   CBC W/ DIFFERENTIAL         Disposition and Plan     Clinical Impression:  Hyperemesis gravidarum  (primary encounter diagnosis)             H&P - H&P Note positives and negatives noted in the HPI. Physical Exam:    /68   Pulse 89   Temp 97.7 °F (36.5 °C) (Temporal)   Resp 14   Wt 108 lb (49 kg)   LMP 01/11/2019 (Exact Date)   SpO2 98%   BMI 20.41 kg/m²    General: No acute distress.  Alert and orient to dehydration  3. Dehydration  1. IVF  4. Elevated LFTs  1. Recheck in am   2. May need US RUQ       OBGYN  HPI: Dann Mata is a 26year old  female who was admitted on 4/3/2019 with c/o nausea and vomitng .  Unable to keep anything down for \" medicine  -will be monitored closely for refeeding syndrome  -antiemetics prn  -daily FHTs       4/6 GASTRO  Chief Complaint/Reason for Follow Up: 26year-old pregnant female female who is being seen in follow up for poor po intake.      HPI/Subjective:    evening and refused replacement   - pt reported she tolerating full liquid diet this morning and afternoon   - continue refeeding, nutrition, electrolyte with thiamine replacement and hydration management per primary team including dietary   - may continue provided   - follow up as outpatient for prenatal care once discharged   Severe major depression order  - continue management with Dr. Iraida Collins  - may use SSRI in pregnancy, prefer Zoloft       Appropriate for INPT status per guidelines for severe dehydration

## 2019-04-05 NOTE — PROGRESS NOTES
ADDENDUM:  She agreed to have the dophoff placed, but will not allow the CXR to be done. It was explained that there would be a cover to protect the baby from the [address]. She refused to have the Xray to check the position of the dophoff.  I asked her to expl

## 2019-04-05 NOTE — PROGRESS NOTES
[facility]  Progress Note     [facility] Patient Status:  Inpatient    1995 MRN [id number]   [facility] 0SW-A Attending Jie Champion MD   Hosp Day # 0 PCP Rahul Gomez MD     Chief Complaint: n/v  S:  Shakes yes and n 1. Intractable n/v with malnutrition/ Hyperemesis gravidarum  1. Zofran  2. GI and OB following  3. dobhoff placed and monitor for refeeding syndrome   2. Metabolic acidosis- Due to dehydration  1. Monitor   3. Dehydration- IVF  4.  Elevated LFTs- monitor
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
## 2019-04-05 NOTE — PLAN OF CARE
GASTROINTESTINAL - ADULT    • Minimal or absence of nausea and vomiting Not Progressing    • Maintains or returns to baseline bowel function Not Progressing    • Maintains adequate nutritional intake (undernourished) Not Progressing        GENITOURINARY -

## 2019-04-06 PROCEDURE — 99233 SBSQ HOSP IP/OBS HIGH 50: CPT | Performed by: HOSPITALIST

## 2019-04-06 PROCEDURE — 99233 SBSQ HOSP IP/OBS HIGH 50: CPT | Performed by: OBSTETRICS & GYNECOLOGY

## 2019-04-06 RX ORDER — DEXTROSE AND SODIUM CHLORIDE 5; .9 G/100ML; G/100ML
INJECTION, SOLUTION INTRAVENOUS CONTINUOUS
Status: DISCONTINUED | OUTPATIENT
Start: 2019-04-06 | End: 2019-04-11

## 2019-04-06 RX ORDER — POTASSIUM CHLORIDE 14.9 MG/ML
20 INJECTION INTRAVENOUS ONCE
Status: COMPLETED | OUTPATIENT
Start: 2019-04-06 | End: 2019-04-06

## 2019-04-06 RX ORDER — POTASSIUM CHLORIDE 20 MEQ/1
40 TABLET, EXTENDED RELEASE ORAL EVERY 4 HOURS
Status: DISCONTINUED | OUTPATIENT
Start: 2019-04-06 | End: 2019-04-06

## 2019-04-06 NOTE — PLAN OF CARE
Assumed patient care at [address] [place] [address] patient's orientation at this time. She barely verbally responds to staff. Patient mainly Nods her head yes and no. Tele-NS/ST when up and moving  No current complaints of pain.   Patient did complain of nausea and PRN me GASTROINTESTINAL - ADULT    • Minimal or absence of nausea and vomiting Progressing    • Maintains or returns to baseline bowel function Progressing    • Maintains adequate nutritional intake (undernourished) Progressing        GENITOURINARY - AD

## 2019-04-06 NOTE — PROGRESS NOTES
Gastroenterology Follow-Up Note      Dolores Marino Patient Status:  Inpatient    1995 MRN [id number]   [facility] 3NE-A Attending Amilcar Yanes MD   Hosp Day # 1 PCP Razia Page MD     Chief Complaint/Reason for Follow Up: 2 Gastroenterology

## 2019-04-06 NOTE — PROGRESS NOTES
[facility]  Progress Note     Joann Colder Patient Status:  Inpatient    1995 MRN [id number]   [facility] 0SW-A Attending Lacey Callejas MD   Hosp Day # 1 PCP Reyna Elizabeth MD     Chief Complaint: n/v  S:  Very depressed. for input(s): PTP, INR in the last 168 hours. No results for input(s): TROP, CK in the last 168 hours. Imaging: Imaging data reviewed in Epic. ASSESSMENT / PLAN:   1. Intractable n/v with malnutrition/ Hyperemesis gravidarum  1.  Zofran  2. GI and OB

## 2019-04-06 NOTE — PROGRESS NOTES
At bedside to assess FHT, audible at 160bpm per doppler. Maternal  per tele monitor. Pt non-verbal, but nods heads in response to questions.

## 2019-04-06 NOTE — PROGRESS NOTES
[facility]  Obstetrics and Gynecology Consultation   Progress Note    Daily Stain Patient Status:  Inpatient    1995 MRN [id number]   [facility] 3NE-A Attending Frandy Madrid MD   Hospital Day 1 PCP Hailey Guy MD with Zofran PRN   - may continue medication after discharge   Obstetrical care  - FHT daily at bedside   - no abdominal/pelvic pain, no vaginal bleeding   - precautions provided   - follow up as outpatient for prenatal care once discharged   Severe major d

## 2019-04-06 NOTE — PLAN OF CARE
Assumed care of patient at 0700. Patient Aox4, responding appropriately to all questions this morning. On Ra. NSR/ST with activity on telemetry. Trial of advancing her diet per GI, currently on full liquid diet.  Patient tolerating well, had a yogurt & l or returns to baseline bowel function  Description  INTERVENTIONS:  - Assess bowel function  - Maintain adequate hydration with IV or PO as ordered and tolerated  - Evaluate effectiveness of GI medications  - Encourage mobilization and activity  - Obtain n care  Description  Interventions:  - Assess ability and encourage patient to participate in ADLs to maximize function  - Promote sitting position while performing ADLs such as feeding, grooming, and bathing  - Educate and encourage patient/family in [place]

## 2019-04-06 NOTE — DIETARY NOTE
Clinical Nutrition - Calorie Count    RD received consult for initiation of calorie count. Pt pulled out her dobhoff overnight and is refusing NG feeds at this time.  Per GI will try to advance to a full liquid diet today and assess calorie count results be

## 2019-04-07 PROCEDURE — 99232 SBSQ HOSP IP/OBS MODERATE 35: CPT | Performed by: HOSPITALIST

## 2019-04-07 PROCEDURE — 99233 SBSQ HOSP IP/OBS HIGH 50: CPT | Performed by: OBSTETRICS & GYNECOLOGY

## 2019-04-07 RX ORDER — MELATONIN
200 DAILY
Status: DISCONTINUED | OUTPATIENT
Start: 2019-04-08 | End: 2019-04-11

## 2019-04-07 RX ORDER — DOXEPIN HYDROCHLORIDE 50 MG/1
1 CAPSULE ORAL DAILY
Status: DISCONTINUED | OUTPATIENT
Start: 2019-04-07 | End: 2019-04-11

## 2019-04-07 RX ORDER — MAGNESIUM OXIDE 400 MG (241.3 MG MAGNESIUM) TABLET
400 TABLET ONCE
Status: COMPLETED | OUTPATIENT
Start: 2019-04-07 | End: 2019-04-07

## 2019-04-07 RX ORDER — POTASSIUM CHLORIDE 20 MEQ/1
20 TABLET, EXTENDED RELEASE ORAL DAILY
Status: DISCONTINUED | OUTPATIENT
Start: 2019-04-07 | End: 2019-04-07

## 2019-04-07 NOTE — PLAN OF CARE
Assumed patient care at 1900  Patient A&O x 4  No complaints of pain or discomfort at this time  VSS  Tele-NSR/ST when ambulating  Patient in much better spirits tonight, she is communicating with staff more, smiling, and answering questions appropriately. Maintain adequate hydration with IV or PO as ordered and tolerated  - Evaluate effectiveness of GI medications  - Encourage mobilization and activity  - Obtain nutritional consult as needed  - Establish a toileting routine/schedule  - Consider collaboratin function  - Promote sitting position while performing ADLs such as feeding, grooming, and bathing  - Educate and encourage patient/family in tolerated functional activity level and precautions during self-care  Outcome: Progressing

## 2019-04-07 NOTE — PROGRESS NOTES
[facility]  Progress Note     Golden Cardona Patient Status:  Inpatient    1995 MRN [id number]   [facility] 0SW-A Attending Petr Ruiz MD   Hosp Day # 2 PCP Jeyson Landa MD     Chief Complaint: n/v  S:  Very depressed. Creatinine Clearance: 166.5 mL/min (A) (based on SCr of 0.4 mg/dL (L)). No results for input(s): PTP, INR in the last 168 hours. No results for input(s): TROP, CK in the last 168 hours. Imaging: Imaging data reviewed in Epic. ASSESSMENT / PLAN:   1.

## 2019-04-07 NOTE — PROGRESS NOTES
Gastroenterology Follow-Up Note      Dana Chavez Patient Status:  Inpatient    1995 MRN [id number]   [facility] 3NE-A Attending Jaylin Seth MD   Hosp Day # 2 PCP Dennise Gamboa MD     Chief Complaint/Reason for Follow Up: 2

## 2019-04-07 NOTE — PROGRESS NOTES
[address] [facility]  Obstetrics and Gynecology Consultation   Progress Note    Tari Crum Patient Status:  Inpatient    1995 MRN [id number]   [facility] 3NE-A Attending Lesly Zhu MD   Hospital Day 2 PCP Aiyana Butts MD once discharged   Severe major depression order  - continue management with Dr. Molly Damon  - may use SSRI in pregnancy, prefer Zoloft     All of the findings and plan were discussed with the patient. She notes understanding and agrees with the plan of care.   A

## 2019-04-08 PROCEDURE — 99232 SBSQ HOSP IP/OBS MODERATE 35: CPT | Performed by: HOSPITALIST

## 2019-04-08 PROCEDURE — 99232 SBSQ HOSP IP/OBS MODERATE 35: CPT | Performed by: OBSTETRICS & GYNECOLOGY

## 2019-04-08 PROCEDURE — 99232 SBSQ HOSP IP/OBS MODERATE 35: CPT | Performed by: OTHER

## 2019-04-08 RX ORDER — DOCUSATE SODIUM 100 MG/1
100 CAPSULE, LIQUID FILLED ORAL 2 TIMES DAILY
Status: DISCONTINUED | OUTPATIENT
Start: 2019-04-08 | End: 2019-04-11

## 2019-04-08 NOTE — PROGRESS NOTES
[facility]  Obstetrics and Gynecology Consultation   Progress Note      Reason for Consultation: hyperemesis gravidarum and malnutrition       Subjective:     Daily Floyd is a 26year old  female who was admitted on 4/3/2019 with c/o hype

## 2019-04-08 NOTE — PROGRESS NOTES
[facility]  Progress Note     Kaelyn Bennett Patient Status:  Inpatient    1995 MRN [id number]   [facility] 0SW-A Attending Chrystal Oppenheim, MD   Hosp Day # 3 PCP Jose Kemp MD     Chief Complaint: n/v  S:  Very depressed b --  0.7  --   --   --   --   --    TP 8.0 5.6*  --  6.0*  --   --   --   --   --     < > = values in this interval not displayed. Estimated Creatinine Clearance: 166.1 mL/min (A) (based on SCr of 0.4 mg/dL (L)).   No results for input(s): PTP, INR in th

## 2019-04-08 NOTE — PLAN OF CARE
Problem: GASTROINTESTINAL - ADULT  Goal: Minimal or absence of nausea and vomiting  Description  INTERVENTIONS:  - Maintain adequate hydration with IV or PO as ordered and tolerated  - Nasogastric tube to low intermittent suction as ordered  - Evaluate e

## 2019-04-08 NOTE — PROGRESS NOTES
Gastroenterology Progress Note  Patient Name: Janes Guthrie  Chief Complaint: N/V  S: Pt denies abdominal pain, diarrhea, constipation. She reports that her nausea has improved. She did not vomit yesterday or today.  She reports that at home she could not ke

## 2019-04-08 NOTE — DIETARY NOTE
Clinical Nutrition - Calorie Count    4/6-RD received consult for initiation of calorie count. Pt pulled out her dobhoff overnight and is refusing NG feeds at this time.  Per GI will try to advance to a full liquid diet today and assess calorie count result

## 2019-04-08 NOTE — PLAN OF CARE
Problem: Patient/Family Goals  Goal: Patient/Family Long Term Goal  Description  Patient's Long Term Goal: Will be able to tolerate meals    Interventions:  - advance diet  - take prescribed antiemetics  - See additional Care Plan goals for specific inte Enhance eating environment  Outcome: Progressing     Problem: METABOLIC/FLUID AND ELECTROLYTES - ADULT  Goal: Electrolytes maintained within normal limits  Description  INTERVENTIONS:  - Monitor labs and rhythm and assess patient for signs and symptoms of

## 2019-04-08 NOTE — PLAN OF CARE
Patient is A&Ox4  Responding verbally and appropriately  Denies any pain or discomfort  No n/v/d  Afebrile, VSS  IVF infusing  NSR on tele; on room air  Care companion at bedside  FHT to be checked by L&D nurse daily  Scheduled zofran given  Tolerating die (undernourished)  Description  INTERVENTIONS:  - Monitor percentage of each meal consumed  - Identify factors contributing to decreased intake, treat as appropriate  - Assist with meals as needed  - Monitor I&O, WT and lab values  - Obtain nutritional cons

## 2019-04-08 NOTE — PROGRESS NOTES
[facility]  Report of Psychiatric Progress Note    Dordiaz Christiansen Patient Status:  Observation    1995 MRN [id number]   [facility] 0SW-A Attending Manjinder Aguiar, [address] Day # 5 PCP Musa Magallanes MD     Date of Admission: 4/3/19 Zoloft than Remeron. Rashard Bunch    History of Present Illness:  25 yo female, 11 wks pregnant, was admitted on 4/3/19 for evaluation of her intractable nausea and vomiting from presumed hyperemesis gravidarum.  She has not been able to hold down liquids She's not the same. \". Sister corroborates that Regi Muir has made NO comments about suicide or not wanting the baby or having problems with her boyfriend. Interval Hx:  4/5/19- She feels weak, tired, and depressed.  She did not awake up to voice, only to n and tube feeds in order to be healthy and keep her baby healthy. She feels tired but denies anxiety, depressed mood, hopelessness, or suicidal ideation. Psychiatry Review Systems: No voices or visions.  No elevated mood, racing thoughts, decreased need Appearance: fair grooming, thin  Behavior: cooperative, good eye contact today    Speech: normal rate and rhythm and soft    Mood: tired, \"I'mfine\"  Affect: restricted    Thought process: logical to basic questions  Thought content: no delusions  Per

## 2019-04-09 PROCEDURE — 99232 SBSQ HOSP IP/OBS MODERATE 35: CPT | Performed by: HOSPITALIST

## 2019-04-09 PROCEDURE — 99232 SBSQ HOSP IP/OBS MODERATE 35: CPT | Performed by: OTHER

## 2019-04-09 RX ORDER — ONDANSETRON 4 MG/1
4 TABLET, ORALLY DISINTEGRATING ORAL EVERY 6 HOURS
Qty: 150 TABLET | Refills: 3 | Status: SHIPPED | OUTPATIENT
Start: 2019-04-09 | End: 2019-04-26

## 2019-04-09 RX ORDER — ONDANSETRON 2 MG/ML
4 INJECTION INTRAMUSCULAR; INTRAVENOUS EVERY 6 HOURS PRN
Status: DISCONTINUED | OUTPATIENT
Start: 2019-04-09 | End: 2019-04-11

## 2019-04-09 RX ORDER — SERTRALINE HYDROCHLORIDE 25 MG/1
25 TABLET, FILM COATED ORAL DAILY
Status: DISCONTINUED | OUTPATIENT
Start: 2019-04-09 | End: 2019-04-10

## 2019-04-09 RX ORDER — ONDANSETRON 4 MG/1
4 TABLET, ORALLY DISINTEGRATING ORAL EVERY 6 HOURS
Status: DISCONTINUED | OUTPATIENT
Start: 2019-04-09 | End: 2019-04-11

## 2019-04-09 RX ORDER — POTASSIUM CHLORIDE 20 MEQ/1
40 TABLET, EXTENDED RELEASE ORAL EVERY 4 HOURS
Status: COMPLETED | OUTPATIENT
Start: 2019-04-09 | End: 2019-04-09

## 2019-04-09 RX ORDER — PSEUDOEPHEDRINE HCL 30 MG
100 TABLET ORAL 2 TIMES DAILY
Qty: 60 CAPSULE | Refills: 5 | Status: SHIPPED | OUTPATIENT
Start: 2019-04-09 | End: 2019-04-26

## 2019-04-09 RX ORDER — ONDANSETRON 4 MG/1
4 TABLET, ORALLY DISINTEGRATING ORAL EVERY 6 HOURS
Status: DISCONTINUED | OUTPATIENT
Start: 2019-04-09 | End: 2019-04-09

## 2019-04-09 NOTE — PLAN OF CARE
Problem: GASTROINTESTINAL - ADULT  Goal: Minimal or absence of nausea and vomiting  Description  INTERVENTIONS:  - Maintain adequate hydration with IV or PO as ordered and tolerated  - Nasogastric tube to low intermittent suction as ordered  - Evaluate e oriented x 4. Calm, cooperative. VSS. Denies pain. On room air. NSR on telemetry. ST with activity. Fetal heart tones daily. Potassium replaced per protocol. Calorie count. Dietary following. Encouraged pt to eat. Pt verbalizes drinking ensure enlive.  Pt e

## 2019-04-09 NOTE — PROGRESS NOTES
RN to bedside to assess heart tones via doppler. Heart tones audible in 160s at this time.  Patient left in stable condition

## 2019-04-09 NOTE — PROGRESS NOTES
[facility]  Report of Psychiatric Progress Note    Janes Fontan Patient Status:  Observation    1995 MRN [id number]   [place] 0SW-A Attending Douglas More, [address] [facility] Day # 6 PCP Darryle Mons, MD     Date of Admission: 4/3/19 calories and nutrition from oral intake, she will need a dophoff and tube feeds. 2) Change Zoloft 25mg daily from solution to pill form. 3) She does not qualify for inpatient psych at this time.  She is open to going to an intensive outpatient progr mentions not being able to eat because of the N/V. She DENIES feeling of hopelessness or suicidal ideation. She denies not eating as a way to starve and abort the baby or a way to kill herself. She says she wants the baby, but does not appear excited.  She lemon fruit ice, ice cream on 4/6 and 5 peach slices, 35RR apple juice, and 180ml Ensure on 4/7. She feels full and not hungry at this time. The nausea has significantly decreased with the schedule Zofran.  She says, \"I'm fine\", and minimizes her depressi 100 mg, Oral, BID  •  Thiamine HCl tab 200 mg, 200 mg, Per NG Tube, Daily  •  multivitamin (ADULT) tab 1 tablet, 1 tablet, Oral, Daily  •  dextrose 5 % and 0.9 % NaCl infusion, , Intravenous, Continuous  •  Metoclopramide HCl (REGLAN) injection 10 mg, 10 m

## 2019-04-09 NOTE — PROGRESS NOTES
[facility]  Progress Note     Taisha Quintanilla Patient Status:  Inpatient    1995 MRN [id number]   [facility] 0SW-A Attending Diane Saenz MD   Hosp Day # 4 PCP Angelika Gonzalez MD     Chief Complaint: n/v  S:  Very depressed s --   --  5.5*    < > = values in this interval not displayed. Estimated Creatinine Clearance: 141.4 mL/min (A) (based on SCr of 0.47 mg/dL (L)). No results for input(s): PTP, INR in the last 168 hours.   No results for input(s): TROP, CK in the last 16

## 2019-04-09 NOTE — DIETARY NOTE
Clinical Nutrition - Calorie Count    Day 1: 4/6  Totals - 240 kcals, 8 g protein    Day 2: 4/7  Totals - 430 kcals, 19 g protein    Day 3: 4/8  Breakfast - 350 kcals, 20 g protein   Lunch - Nothing Ordered  Dinner - 206kcals, 10g pro  Total Intake:  556kc

## 2019-04-09 NOTE — DIETARY NOTE
[facility]    NUTRITION FOLLOW UP ASSESSMENT    Pt meets severe malnutrition criteria.     CRITERIA FOR MALNUTRITION DIAGNOSIS:  Criteria for severe malnutrition diagnosis: chronic illness related to wt loss greater than 7.5% in 3 months and energy int advancement in diet- low fat/high carbohydrate and adequate hydration. Again pt was not receptive. ANTHROPOMETRICS:  Ht:  5'1\"  Wt: 47.7 kg (105 lb 1.6 oz). BMI: Body mass index is 19.86 kg/m².   IBW: 48 kg  Usual Body Wt:128#    WEIGHT HISTORY:   Wt

## 2019-04-09 NOTE — PLAN OF CARE
Patient is A&Ox4  Responding verbally and appropriately  Denies any pain or discomfort  With minimal nausea but no vomiting  Afebrile, VSS  IVF infusing  NSR on tele; on room air  Care companion at bedside  FHT to be checked by L&D nurse daily  Scheduled z Monitor percentage of each meal consumed  - Identify factors contributing to decreased intake, treat as appropriate  - Assist with meals as needed  - Monitor I&O, WT and lab values  - Obtain nutritional consult as needed  - Optimize oral hygiene and moistu

## 2019-04-09 NOTE — PROGRESS NOTES
Gastroenterology Progress Note  Patient Name: Tari Crum  Chief Complaint: N/V  S: Pt denies abdominal pain or vomiting. She ate fruit and ice cream for dinner. She is having Ensure shakes. She denies nausea.  She has not had recent BM.   O: /58 (BP and coordinating care: 27 minutes. We will follow peripherally.      Marc Khoury DO  10:09 AM  4/9/2019  [facility]astroenter[facility]  [phone]

## 2019-04-09 NOTE — PROGRESS NOTES
[address] [facility]  OB/GYN: Antepartum Progress Note     SUBJECTIVE:  Pt is a 26year old [address] [facility] female at 12w3d, edc 10/18/19 who was admitted on 4/3/2019 for hyperemesis, metabolic derangements, depression. Fetal Movement: na. Vaginal Bleeding: no. of pregnancy     Major depressive disorder, single episode, severe ([address]     Hyponatremia     Malnutrition ([address]      Continue care as per primary mds  Recommend with next lab draw to order new ob labs--orders entered.

## 2019-04-10 ENCOUNTER — TELEPHONE (OUTPATIENT)
Dept: OBGYN CLINIC | Facility: CLINIC | Age: 24
End: 2019-04-10

## 2019-04-10 PROCEDURE — 99232 SBSQ HOSP IP/OBS MODERATE 35: CPT | Performed by: HOSPITALIST

## 2019-04-10 PROCEDURE — 3E0336Z INTRODUCTION OF NUTRITIONAL SUBSTANCE INTO PERIPHERAL VEIN, PERCUTANEOUS APPROACH: ICD-10-PCS | Performed by: HOSPITALIST

## 2019-04-10 PROCEDURE — 02HV33Z INSERTION OF INFUSION DEVICE INTO SUPERIOR VENA CAVA, PERCUTANEOUS APPROACH: ICD-10-PCS | Performed by: HOSPITALIST

## 2019-04-10 PROCEDURE — 99232 SBSQ HOSP IP/OBS MODERATE 35: CPT | Performed by: OTHER

## 2019-04-10 PROCEDURE — B548ZZA ULTRASONOGRAPHY OF SUPERIOR VENA CAVA, GUIDANCE: ICD-10-PCS | Performed by: HOSPITALIST

## 2019-04-10 RX ORDER — MELATONIN
3 NIGHTLY PRN
Status: DISCONTINUED | OUTPATIENT
Start: 2019-04-10 | End: 2019-04-11

## 2019-04-10 RX ORDER — MAGNESIUM OXIDE 400 MG (241.3 MG MAGNESIUM) TABLET
3 TABLET NIGHTLY
Status: DISCONTINUED | OUTPATIENT
Start: 2019-04-10 | End: 2019-04-10

## 2019-04-10 RX ORDER — DOCUSATE SODIUM 100 MG/1
100 CAPSULE, LIQUID FILLED ORAL 2 TIMES DAILY PRN
Status: DISCONTINUED | OUTPATIENT
Start: 2019-04-10 | End: 2019-04-11

## 2019-04-10 RX ORDER — POTASSIUM CHLORIDE 20 MEQ/1
40 TABLET, EXTENDED RELEASE ORAL EVERY 4 HOURS
Status: COMPLETED | OUTPATIENT
Start: 2019-04-10 | End: 2019-04-10

## 2019-04-10 RX ORDER — SODIUM CHLORIDE 0.9 % (FLUSH) 0.9 %
10 SYRINGE (ML) INJECTION AS NEEDED
Status: DISCONTINUED | OUTPATIENT
Start: 2019-04-10 | End: 2019-04-11

## 2019-04-10 NOTE — CM/SW NOTE
Received orders for home TPN, PICC currently being placed. Await RD recommendations and orders.      Gely Banerjee RN,   Phone [phone]  Pager [phone]

## 2019-04-10 NOTE — TELEPHONE ENCOUNTER
Spoke with Dr. Yoselyn Moreno who stated he will get back to me with a decision. Informed jesse if we don't contact her today it will be tomorrow morning.

## 2019-04-10 NOTE — HOME CARE LIAISON
REFERRAL RECEIVED FROM CHANELLE YOUSIF. [facility] WILL NOT BE ABLE TO ACCEPT PATIENT. MESSAGE LEFT FOR CHANELLE. [facility] MADE AWARE.

## 2019-04-10 NOTE — PLAN OF CARE
Pt is aox 4. States no pain. IV fluids infusing. Boyfriend at bedside. Resting comfortably. Sitter at bedside.  Call light within reach

## 2019-04-10 NOTE — TELEPHONE ENCOUNTER
This needs to be addressed with provider on call. I have not been the only physician to see this patient. I have not seen this patient since Sunday and I am not familiar with her current hospital course since she was last seen by me.

## 2019-04-10 NOTE — BH PROGRESS NOTE
Seen the pt earlier per Dr. Christina Briscoe. She said, she is not committing to a Eating Disorder outpt program.  She said, she doesn't have a car and wont be qable to get there. She was encouraged to be open and honest with Dr. Christina Briscoe when she sees him tomorrow.   Ta

## 2019-04-10 NOTE — CONSULTS
[facility]  Maternal-Fetal Medicine Inpatient Consultation    Date of Admission:  4/3/2019  Date of Consult:  4/10/2019    Reason for Consult:   Hyperemesis    History of Present Illness:  Marjan Rg is a a(n) 26year old female.  G1 with an infusion, , Intravenous, Continuous  •  Metoclopramide HCl (REGLAN) injection 10 mg, 10 mg, Intravenous, Q8H PRN      Physical Exam  Gen: Well-developed thin female, no acute distress  Abd: Soft nontender      Laboratory Data:  Lab Results   Component Valu be taken with the B6. Nonpharmacologic treatments include avoidance of triggers such as eating saltly or high protein meals  Aromatic therapiees such as lemon, mint or orange. Delmis(powdered 1 gram daily) has also been used.   Avoid iron supplements

## 2019-04-10 NOTE — TELEPHONE ENCOUNTER
Spoke to Brennon who is the  at [facility]. She stated that the patient is currently admitted for hyperemesis. She needs an order for TPN and wants our office to sign off on it. She spoke to Dr. Phil Stokes who stated that the PCP has to sign off on it.  She

## 2019-04-10 NOTE — DIETARY NOTE
Clinical Nutrition - TPN    TPN to be initiated tonight. Tonight's bag to provide approx 50% estimated needs. TPN tonight to provide 660 NPC (430 dextrose calories, 230 lipid calories), 30% lipids, 30 grams protein in 2000 ml fluid.    D/C IVF when TPN hang Problem: Mobility Impaired (Adult and Pediatric)  Goal: *Acute Goals and Plan of Care (Insert Text)  Physical Therapy Goals  Initiated 6/5/2018  1. Patient will move from supine to sit and sit to supine , scoot up and down and roll side to side in bed with modified independence within 7 day(s). 2.  Patient will transfer from bed to chair and chair to bed with modified independence using the least restrictive device within 7 day(s). 3.  Patient will perform sit to stand with modified independence within 7 day(s). 4.  Patient will ambulate with modified independence for 150 feet with the least restrictive device within 7 day(s). 5.  Patient will ascend/descend 3 stairs with 2 handrail(s) with supervision/set-up within 7 day(s). physical Therapy TREATMENT  Patient: Celestino Arriola [de-identified][de-identified] y.o. female)  Date: 6/6/2018  Diagnosis: Altered mental status  Altered mental status <principal problem not specified>       Precautions:    Chart, physical therapy assessment, plan of care and goals were reviewed. ASSESSMENT:  Pt was able to increase gait tolerance. Pt has a slight anterior lean with gait. Pt demonstrated decrease safety with taking hand off the walker. Pt required CGA for gait. PTA pt furnatured walked with a cane. Pt would be alone a period of time during the day. Pt could benefit from SNF to progress safety with upright mobility and balance. Progression toward goals:  [x]    Improving appropriately and progressing toward goals  []    Improving slowly and progressing toward goals  []    Not making progress toward goals and plan of care will be adjusted     PLAN:  Patient continues to benefit from skilled intervention to address the above impairments. Continue treatment per established plan of care.   Discharge Recommendations:  Skilled Nursing Facility  Further Equipment Recommendations for Discharge:  Rolling walker     SUBJECTIVE:   Patient stated I just want to go home but I am going to rehab.    OBJECTIVE DATA SUMMARY:   Critical Behavior:  Neurologic State: Alert, Eyes open spontaneously  Orientation Level: Oriented X4  Cognition: Appropriate decision making, Appropriate for age attention/concentration, Appropriate safety awareness, Follows commands     Functional Mobility Training:  Bed Mobility:                    Transfers:  Sit to Stand: Stand-by assistance  Stand to Sit: Stand-by assistance                             Balance:  Sitting: Intact  Standing: Impaired  Standing - Static: Good  Standing - Dynamic : Good  Ambulation/Gait Training:  Distance (ft): 300 Feet (ft)  Assistive Device: Gait belt;Walker, rolling  Ambulation - Level of Assistance: Contact guard assistance        Gait Abnormalities: Decreased step clearance              Speed/Yumi: Pace decreased (<100 feet/min); Slow  Step Length: Left shortened;Right shortened                    Stairs:              Neuro Re-Education:    Therapeutic Exercises:     Pain:  Pain Scale 1: Numeric (0 - 10)  Pain Intensity 1: 0              Activity Tolerance:   Limited   Please refer to the flowsheet for vital signs taken during this treatment.   After treatment:   [x]    Patient left in no apparent distress sitting up in chair  []    Patient left in no apparent distress in bed  [x]    Call bell left within reach  [x]    Nursing notified  []    Caregiver present  [x]    Bed alarm activated    COMMUNICATION/COLLABORATION:   The patients plan of care was discussed with: Registered Nurse    Alfred Nissen, PTA   Time Calculation: 16 mins

## 2019-04-10 NOTE — TELEPHONE ENCOUNTER
Ella stated that it is administered through the infusion service pharmacy and given by RN. We just have to place order for [facility] to manage home TPN. Otherwise any changes in the mixtures and initial TPN is ordered by dietician.   Please call [phone]

## 2019-04-10 NOTE — PROGRESS NOTES
[facility]  Report of Psychiatric Progress Note    Azalea Mcgregor Patient Status:  Observation    1995 MRN [id number]   [facility] 0SW-A Attending Catarino Bar, [address] [facility] Day # 7 PCP Arnold Ledezma MD     Date of Admission: 4/3/19 it prn. 4) Will ask psych liaison to talk to her about the options of seeing a psychiatrist and therapist vs going to an intensive outpatient mental health program 3 hrs/day, Mon-Friday.      Ira Choudhary    History of Present Illness:  27 yo female, 6 says she wants the baby, but does not appear excited. She has apathy and anhedonia. Per sister, \"She's not the same person. She doesn't laugh anymore. She used to go out. She's not the same. \".  Sister corroborates that Veronica has made NO comments about agustin Zofran. She says, \"I'm fine\", and minimizes her depressive symptoms. She wants to try eating more before considering the dofhoff again. If needed, she is open to the dophoff and tube feeds in order to be healthy and keep her baby healthy.  She feels tired •  Potassium Chloride ER (K-DUR M20) CR tab 40 mEq, 40 mEq, Oral, Q4H  •  Sertraline HCl (ZOLOFT) tab 25 mg, 25 mg, Oral, Daily  •  ondansetron HCl (ZOFRAN) injection 4 mg, 4 mg, Intravenous, Q6H PRN  •  ondansetron (ZOFRAN-ODT) disintegrating tab 4 mg, 04/10/2019     04/10/2019    MG 2.0 04/10/2019    PHOS 3.8 04/10/2019   Urine tox screen NEG

## 2019-04-10 NOTE — PROGRESS NOTES
[facility]  Progress Note     Dann Mata Patient Status:  Inpatient    1995 MRN [id number]   [facility] 0SW-A Attending China Momin MD   Hosp Day # 5 PCP Winnifred Ormond, MD     Chief Complaint: n/v  S:  Very depressed s 0.34 mg/dL (L)). No results for input(s): PTP, INR in the last 168 hours. No results for input(s): TROP, CK in the last 168 hours. Imaging: Imaging data reviewed in Epic. ASSESSMENT / PLAN:   1.  Intractable n/v with malnutrition/ Hyperemesis

## 2019-04-10 NOTE — TELEPHONE ENCOUNTER
Left message for Ella to call back as Dr. Jose Angel Quezada said he needs to know the process of how everything works before signing off.

## 2019-04-10 NOTE — CM/SW NOTE
Patient in need of TPN at home, referrals sent to [facility] and [facility]. Per rounds, anticipate d/c tomorrow. 1515 addendum, [facility] unable to accept; referral sent to multiple  agencies, currently pending.  Call to patient's PCP Dr Kimber Peralta

## 2019-04-11 ENCOUNTER — TELEPHONE (OUTPATIENT)
Dept: FAMILY MEDICINE CLINIC | Facility: CLINIC | Age: 24
End: 2019-04-11

## 2019-04-11 VITALS
RESPIRATION RATE: 16 BRPM | HEART RATE: 95 BPM | TEMPERATURE: 98 F | BODY MASS INDEX: 20.2 KG/M2 | HEIGHT: 61 IN | OXYGEN SATURATION: 96 % | SYSTOLIC BLOOD PRESSURE: 105 MMHG | DIASTOLIC BLOOD PRESSURE: 68 MMHG | WEIGHT: 107 LBS

## 2019-04-11 PROCEDURE — 99232 SBSQ HOSP IP/OBS MODERATE 35: CPT | Performed by: OTHER

## 2019-04-11 PROCEDURE — 99231 SBSQ HOSP IP/OBS SF/LOW 25: CPT | Performed by: OBSTETRICS & GYNECOLOGY

## 2019-04-11 PROCEDURE — 99239 HOSP IP/OBS DSCHRG MGMT >30: CPT | Performed by: HOSPITALIST

## 2019-04-11 NOTE — TELEPHONE ENCOUNTER
Doc,    Please advise. This pt is pregnant and they were directed to speak to the ob/gyne who sees her which is an [facility] ob/gyne provider.

## 2019-04-11 NOTE — PROGRESS NOTES
NURSING DISCHARGE NOTE    Discharged Home via Wheelchair. Accompanied by Family member  Belongings Taken by patient/family. Pt assessed and ready for discharge. Discharge instructions and medication given and reviewed with patient and her sister.

## 2019-04-11 NOTE — CM/SW NOTE
TPN orders to be updated by dietitian--[facility] care liaison diandra given requested clinical    Awaiting [facility] acceptance for [facility]hc from [facility] per [facility]

## 2019-04-11 NOTE — DIETARY NOTE
Clinical Nutrition - TPN    Given pt's PO intake, plan to continue tonight's bag to contain approx 50% estimated needs. TPN tonight to provide 660 NPC (430 dextrose calories, 230 lipid calories), 30% lipids, 30 grams protein in 2000 ml fluid.      Electroly

## 2019-04-11 NOTE — PLAN OF CARE
A&Ox4. On room air, lungs clear. Abdomen nontender, BS active. Denies N&V- receives scheduled Zofran. No BM- receiving scheduled Colace. Tolerating diet- calorie count in progress. Daily blind weights.  Right arm precautions d/t PICC - continuous TPN starte Maintains adequate nutritional intake (undernourished)  Description  INTERVENTIONS:  - Monitor percentage of each meal consumed  - Identify factors contributing to decreased intake, treat as appropriate  - Assist with meals as needed  - Monitor I&O, WT and

## 2019-04-11 NOTE — TELEPHONE ENCOUNTER
Spoke with     OBGYN should be the ones who are managing this    Patient should followup with us after discharge for recheck, howvever anyway - and worst case scenario, if OB hasn't signed off, we can temporarily until it gets straightened out

## 2019-04-11 NOTE — DISCHARGE SUMMARY
[facility] [place] HOSPITALIST  DISCHARGE SUMMARY     Cindyfarida Cardona Patient Status:  Inpatient    1995 MRN [id number]   [facility] 3NE-A Attending Petr Ruiz MD   Hosp Day # 6 PCP Jeyson Landa MD     Date of Admission: 4/3/2019  Date of Dis with hyperemesis gravidarum with intractable vomiting on admission. The patient evaluated by OB and GI. No further vomiting during the hospital course.   patietn was seen by psychiatry and iniitally thought due to depression the patient was NOT decisional ZOFRAN-ODT      Take 1 tablet (4 mg total) by mouth every 6 (six) hours.    Stop taking on:  5/9/2019  Quantity:  150 tablet  Refills:  3        CONTINUE taking these medications      Instructions Prescription details   Metoclopramide HCl 10 MG Tabs  Common

## 2019-04-11 NOTE — BH PROGRESS NOTE
Went to see the pt per psychiatrist and gave her referrals to f/u with a psychiatrist and psychotherapist.

## 2019-04-11 NOTE — TELEPHONE ENCOUNTER
Ella called back again. Dr. Joelle Qureshi stated he informed you of the situation. Can you please advise if you will sign off and if not, can you advise if this is going to be a group decision to not sign off on order.

## 2019-04-11 NOTE — PROGRESS NOTES
[facility]  Report of Psychiatric Progress Note    Jason Young Patient Status:  Observation    1995 MRN [id number]   [facility] 0SW-A Attending Nicole Barnhart, [address] Day # 8 PCP Rahul Gomez MD     Date of Admission: 4/3/19 open to seeing a weekly therapist. Salvatore Astorga her that seeing the weekly therapist is the first priority.  If her mood does not improve in 1 month, she should see a psychiatrist.     6) She can go home from the psych perspective once she has the referrals and the denies not eating as a way to starve and abort the baby or a way to kill herself. She says she wants the baby, but does not appear excited. She has apathy and anhedonia. Per sister, \"She's not the same person. She doesn't laugh anymore.  She used to go out not hungry at this time. The nausea has significantly decreased with the schedule Zofran. She says, \"I'm fine\", and minimizes her depressive symptoms. She wants to try eating more before considering the dofhoff again.  If needed, she is open to the dophof past medical history. History reviewed. No pertinent surgical history. History reviewed. No pertinent family history. reports that she has never smoked. She has never used smokeless tobacco. She reports that she does not drink alcohol or use drugs. 3.8 04/11/2019     04/11/2019    CO2 22.0 04/11/2019    GLU 79 04/11/2019    CA 9.0 04/11/2019    ALB 2.6 04/11/2019    ALKPHO 37 04/11/2019    BILT 0.3 04/11/2019    TP 6.1 04/11/2019    AST 42 04/11/2019     04/11/2019    MG 2.4 04/11/2019

## 2019-04-11 NOTE — CM/SW NOTE
Call placed to patient's pcp dr Dora Stanley regarding ongoing TPN--await call back    Call received from dr martínez--although he will be on vacation until 4-22--his partners to sign TPN orders if not by ob/gyn    Received call from [facility] with ob/gyn office--

## 2019-04-11 NOTE — PROGRESS NOTES
OB PN  S:  Pt is a 27 yo female  at [address] admitted for severe hyperemesis gravidarum, malnutrition and electrolyte abnormalities. Pt has definite improvement since admission. Pt now engages in conversation.  Reports she ate toast and yogurt last ni

## 2019-04-11 NOTE — CM/SW NOTE
[facility] liaison diandra met with patient who will discharge today and have TPN and [facility] nurse see her tonight for start of TPN @ home

## 2019-04-11 NOTE — PROGRESS NOTES
[facility] HOSPITAL[facility]  Progress Note     Leonardoalvaro Yu Patient Status:  Inpatient    1995 MRN [id number]   [facility] 0SW-A Attending Erlinda Dumont MD   Hosp Day # 6 PCP Leesa Leon MD     Chief Complaint: n/v  S:  Very depressed s Hyperemesis gravidarum. Dobhoff was placed but taken out by patient- found NOT to be decisional initially but now decisional  1. Refuses feeding tube  2. PICC line in RUE and starting TPN  3. Zofran   4. GI, Psych, and OB following   5.  Advancing diet as t

## 2019-04-11 NOTE — TELEPHONE ENCOUNTER
Page Lyons, the , to let her know that Dr. Jaye Garcia sign off on the orders. Castro stated that we do not have to do anything at this time, just needed someone to say that they would sign off on it.  She stated that the pharmacist will wri

## 2019-04-11 NOTE — TELEPHONE ENCOUNTER
Michele Rodgers from [facility] is the  for Veronica, she is wondering if Dr Radha Eduardo can give her a call, she is having trouble setting her up with TPN through the ob/gyn, Dr Andrew Palomares has been managing her care at the facility, she would like to know if he c

## 2019-04-11 NOTE — CM/SW NOTE
Call placed as follow up to [facility] @ ob/gyn office ([phone])--informed request for TPN currently being reviewed if they will follow/sign for TPN @ discharge--awaiting response    [facility] to provide [facility]

## 2019-04-16 ENCOUNTER — TELEPHONE (OUTPATIENT)
Dept: OBGYN CLINIC | Facility: CLINIC | Age: 24
End: 2019-04-16

## 2019-04-16 NOTE — TELEPHONE ENCOUNTER
Received TPN order from [facility]. Dr. David Herbert signed and faxed back.

## 2019-04-16 NOTE — TELEPHONE ENCOUNTER
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

## 2019-04-18 ENCOUNTER — TELEPHONE (OUTPATIENT)
Dept: OBGYN CLINIC | Facility: CLINIC | Age: 24
End: 2019-04-18

## 2019-04-18 NOTE — TELEPHONE ENCOUNTER
Left message for patient to call back. Received call from [facility] at [facility]. Per [facility], pt went to [facility] last week for dizziness and her PICC line was removed. [facility] spoke with the patient's sister who relayed this information.   Sister

## 2019-04-18 NOTE — TELEPHONE ENCOUNTER
Patient returned call. She is feeling much better. Advised her to contact [facility] as they have been trying to connect with her and have been unsuccessful. Patient states understanding and will call them.

## 2019-04-19 NOTE — TELEPHONE ENCOUNTER
Received Paperwork from [facility]  Not really sure what the content is    Placed in nurse bin in [place]

## 2019-04-22 NOTE — TELEPHONE ENCOUNTER
Discussed with Janelle Garcia at [facility] that patient never received TPN and her PICC line has been removed and the patient states she feels much better. Janelle Garcia was not aware of the updates in the patient's care.   Returned unsigned Plan of Treatment to [facility]

## 2019-04-23 NOTE — TELEPHONE ENCOUNTER
Received call from [facility]. They need a copy of the signed form so that they can bill pt's insurance because they initiated care for the patient, even though she never received TPN. They can then close her account and send a discharge summary.      Anabel PASSING OUT

## 2019-04-26 ENCOUNTER — OFFICE VISIT (OUTPATIENT)
Dept: FAMILY MEDICINE CLINIC | Facility: CLINIC | Age: 24
End: 2019-04-26
Payer: COMMERCIAL

## 2019-04-26 VITALS
HEIGHT: 61.42 IN | SYSTOLIC BLOOD PRESSURE: 90 MMHG | TEMPERATURE: 98 F | WEIGHT: 107.13 LBS | HEART RATE: 104 BPM | DIASTOLIC BLOOD PRESSURE: 50 MMHG | BODY MASS INDEX: 19.97 KG/M2

## 2019-04-26 DIAGNOSIS — O21.0 HYPEREMESIS GRAVIDARUM: ICD-10-CM

## 2019-04-26 DIAGNOSIS — O26.52 MATERNAL HYPOTENSION SYNDROME IN SECOND TRIMESTER: ICD-10-CM

## 2019-04-26 DIAGNOSIS — F41.8 DEPRESSION WITH ANXIETY: ICD-10-CM

## 2019-04-26 DIAGNOSIS — Z3A.15 15 WEEKS GESTATION OF PREGNANCY: Primary | ICD-10-CM

## 2019-04-26 DIAGNOSIS — E63.9 POOR NUTRITION: ICD-10-CM

## 2019-04-26 DIAGNOSIS — R63.4 WEIGHT LOSS: ICD-10-CM

## 2019-04-26 PROCEDURE — 99215 OFFICE O/P EST HI 40 MIN: CPT | Performed by: FAMILY MEDICINE

## 2019-04-26 PROCEDURE — 1111F DSCHRG MED/CURRENT MED MERGE: CPT | Performed by: FAMILY MEDICINE

## 2019-04-26 NOTE — PROGRESS NOTES
Yulia Santos is a 22year old female here for Patient presents with:  Hospital F/U: Hospitalized x 3 days, discharged from [facility] on 04/14/2019. Follow up today      HPI:       1. 15 weeks gestation of pregnancy  2.  Maternal hypotension Denies  --SKIN: Denies  All other systems reviewed are negative    PHYSICAL EXAM:   BP 90/50 (BP Location: Left arm, Patient Position: Sitting, Cuff Size: adult)   Pulse 104   Temp 98.2 °F (36.8 °C) (Oral)   Ht 61.42\"   Wt 107 lb 2 oz   LMP 01/11/2019 (Ex

## 2019-04-26 NOTE — PATIENT INSTRUCTIONS
--  will call you with information about counselors and psychiatrists  -- continue to eat 3 meals a day with frequent snacks  -- focus on protein   -- call to schedule appt with nutritionist  -- followup with obgyn as planned

## 2019-05-01 ENCOUNTER — INITIAL PRENATAL (OUTPATIENT)
Dept: OBGYN CLINIC | Facility: CLINIC | Age: 24
End: 2019-05-01
Payer: COMMERCIAL

## 2019-05-01 ENCOUNTER — LAB ENCOUNTER (OUTPATIENT)
Dept: LAB | Age: 24
End: 2019-05-01
Attending: NURSE PRACTITIONER
Payer: COMMERCIAL

## 2019-05-01 VITALS
DIASTOLIC BLOOD PRESSURE: 66 MMHG | BODY MASS INDEX: 20.58 KG/M2 | HEIGHT: 61 IN | SYSTOLIC BLOOD PRESSURE: 108 MMHG | WEIGHT: 109 LBS | HEART RATE: 85 BPM

## 2019-05-01 DIAGNOSIS — Z13.29 SCREENING FOR ENDOCRINE, NUTRITIONAL, METABOLIC AND IMMUNITY DISORDER: ICD-10-CM

## 2019-05-01 DIAGNOSIS — Z13.228 SCREENING FOR ENDOCRINE, NUTRITIONAL, METABOLIC AND IMMUNITY DISORDER: ICD-10-CM

## 2019-05-01 DIAGNOSIS — Z11.3 SCREEN FOR STD (SEXUALLY TRANSMITTED DISEASE): ICD-10-CM

## 2019-05-01 DIAGNOSIS — Z34.02 ENCOUNTER FOR SUPERVISION OF NORMAL FIRST PREGNANCY IN SECOND TRIMESTER: ICD-10-CM

## 2019-05-01 DIAGNOSIS — Z13.0 SCREENING FOR ENDOCRINE, NUTRITIONAL, METABOLIC AND IMMUNITY DISORDER: ICD-10-CM

## 2019-05-01 DIAGNOSIS — Z13.21 SCREENING FOR ENDOCRINE, NUTRITIONAL, METABOLIC AND IMMUNITY DISORDER: ICD-10-CM

## 2019-05-01 DIAGNOSIS — Z34.02 ENCOUNTER FOR SUPERVISION OF NORMAL FIRST PREGNANCY IN SECOND TRIMESTER: Primary | ICD-10-CM

## 2019-05-01 DIAGNOSIS — Z12.4 CERVICAL CANCER SCREENING: ICD-10-CM

## 2019-05-01 PROCEDURE — 88175 CYTOPATH C/V AUTO FLUID REDO: CPT | Performed by: NURSE PRACTITIONER

## 2019-05-01 PROCEDURE — 85025 COMPLETE CBC W/AUTO DIFF WBC: CPT

## 2019-05-01 PROCEDURE — 87491 CHLMYD TRACH DNA AMP PROBE: CPT | Performed by: NURSE PRACTITIONER

## 2019-05-01 PROCEDURE — 80053 COMPREHEN METABOLIC PANEL: CPT

## 2019-05-01 PROCEDURE — 81511 FTL CGEN ABNOR FOUR ANAL: CPT

## 2019-05-01 PROCEDURE — 87591 N.GONORRHOEAE DNA AMP PROB: CPT | Performed by: NURSE PRACTITIONER

## 2019-05-01 PROCEDURE — 36415 COLL VENOUS BLD VENIPUNCTURE: CPT

## 2019-05-01 PROCEDURE — 87086 URINE CULTURE/COLONY COUNT: CPT | Performed by: NURSE PRACTITIONER

## 2019-05-01 RX ORDER — ONDANSETRON 4 MG/1
TABLET, ORALLY DISINTEGRATING ORAL
Refills: 3 | COMMUNITY
Start: 2019-04-26 | End: 2019-06-27

## 2019-05-01 NOTE — PROGRESS NOTES
Here for initial prenatal visit with our group. 22year old  at 15w5d. This pregnancy has been complicated by hyperemesis and hospitalization for weight loss and metabolic imbalances. She was recommended to start TPN but never did.    She has als

## 2019-05-03 NOTE — TELEPHONE ENCOUNTER
Margaret Leonardo from [facility] called stating that they have not yet received the plan of care form from our office.  Please call her at [phone]

## 2019-05-03 NOTE — TELEPHONE ENCOUNTER
Bhupinder Lott from [place] [address] requesting 3 page Plan of Treatment to be signed and faxed back. Maura Lennox that this patient never received TPN and her PICC line has been removed. Bhupinder Lott will have an RN call us back on Monday.

## 2019-05-06 DIAGNOSIS — E87.6 HYPOKALEMIA: ICD-10-CM

## 2019-05-06 DIAGNOSIS — O21.0 HYPEREMESIS GRAVIDARUM: Primary | ICD-10-CM

## 2019-05-06 DIAGNOSIS — E87.1 HYPONATREMIA: ICD-10-CM

## 2019-05-06 DIAGNOSIS — E87.2 METABOLIC ACIDOSIS: ICD-10-CM

## 2019-05-06 DIAGNOSIS — E46 MALNUTRITION, UNSPECIFIED TYPE (HCC): ICD-10-CM

## 2019-05-06 NOTE — PROGRESS NOTES
Patient returned call. Reported results and plan for repeat labs in 4 weeks. Patient states understanding. She has not seen a dietician. Advised that she needs to call central scheduling to get appt for dietician; referral is already on chart.  Patient stat

## 2019-05-06 NOTE — TELEPHONE ENCOUNTER
Spoke to Lakesha. She stated that patient received TPN one time and the RN did do a full eval on her. She is no longer receiving TPN. In order for [facility] to bill insurance they need MD to sign off on one visit.      Advised to fax to [phone] for Dr. Spencer Rajan

## 2019-05-07 NOTE — TELEPHONE ENCOUNTER
Form signed by Dr. Lobo May and faxed to [facility] at [phone]. Copy in RN desk in [place].

## 2019-05-30 ENCOUNTER — ULTRASOUND ENCOUNTER (OUTPATIENT)
Dept: OBGYN CLINIC | Facility: CLINIC | Age: 24
End: 2019-05-30
Payer: COMMERCIAL

## 2019-05-30 ENCOUNTER — ROUTINE PRENATAL (OUTPATIENT)
Dept: OBGYN CLINIC | Facility: CLINIC | Age: 24
End: 2019-05-30
Payer: COMMERCIAL

## 2019-05-30 VITALS — WEIGHT: 115 LBS | BODY MASS INDEX: 22 KG/M2 | DIASTOLIC BLOOD PRESSURE: 56 MMHG | SYSTOLIC BLOOD PRESSURE: 100 MMHG

## 2019-05-30 DIAGNOSIS — Z3A.19 19 WEEKS GESTATION OF PREGNANCY: Primary | ICD-10-CM

## 2019-05-30 DIAGNOSIS — Z34.02 ENCOUNTER FOR SUPERVISION OF NORMAL FIRST PREGNANCY IN SECOND TRIMESTER: ICD-10-CM

## 2019-05-30 DIAGNOSIS — Z36.89 ENCOUNTER FOR FETAL ANATOMIC SURVEY: ICD-10-CM

## 2019-05-30 PROCEDURE — 76805 OB US >/= 14 WKS SNGL FETUS: CPT | Performed by: OBSTETRICS & GYNECOLOGY

## 2019-05-30 NOTE — PROGRESS NOTES
BETTY  Doing well  RH +  S/P Anatomy scan normal  1 hr glucose (24-28wks)to sched next visit  Feels much better, rare nausea  RTC q4wks

## 2019-06-27 ENCOUNTER — ROUTINE PRENATAL (OUTPATIENT)
Dept: OBGYN CLINIC | Facility: CLINIC | Age: 24
End: 2019-06-27
Payer: COMMERCIAL

## 2019-06-27 ENCOUNTER — LAB ENCOUNTER (OUTPATIENT)
Dept: LAB | Age: 24
End: 2019-06-27
Attending: OBSTETRICS & GYNECOLOGY
Payer: COMMERCIAL

## 2019-06-27 VITALS — WEIGHT: 120 LBS | BODY MASS INDEX: 23 KG/M2 | DIASTOLIC BLOOD PRESSURE: 64 MMHG | SYSTOLIC BLOOD PRESSURE: 98 MMHG

## 2019-06-27 DIAGNOSIS — E46 MALNUTRITION, UNSPECIFIED TYPE (HCC): ICD-10-CM

## 2019-06-27 DIAGNOSIS — E87.6 HYPOKALEMIA: ICD-10-CM

## 2019-06-27 DIAGNOSIS — E87.2 METABOLIC ACIDOSIS: ICD-10-CM

## 2019-06-27 DIAGNOSIS — Z34.02 ENCOUNTER FOR SUPERVISION OF NORMAL FIRST PREGNANCY IN SECOND TRIMESTER: Primary | ICD-10-CM

## 2019-06-27 DIAGNOSIS — E87.1 HYPONATREMIA: ICD-10-CM

## 2019-06-27 DIAGNOSIS — O21.0 HYPEREMESIS GRAVIDARUM: ICD-10-CM

## 2019-06-27 DIAGNOSIS — Z3A.19 19 WEEKS GESTATION OF PREGNANCY: ICD-10-CM

## 2019-06-27 PROCEDURE — 85025 COMPLETE CBC W/AUTO DIFF WBC: CPT

## 2019-06-27 PROCEDURE — 36415 COLL VENOUS BLD VENIPUNCTURE: CPT

## 2019-06-27 PROCEDURE — 80053 COMPREHEN METABOLIC PANEL: CPT

## 2019-06-27 PROCEDURE — 82950 GLUCOSE TEST: CPT

## 2019-06-27 NOTE — PROGRESS NOTES
BETTY  Doing well, co concerns or questions  FM is good  RH positive  Completed 1 hour GTT/ CBC earlier today  RTC 4wks

## 2019-06-28 RX ORDER — POTASSIUM CHLORIDE 20 MEQ/1
20 TABLET, EXTENDED RELEASE ORAL DAILY
Qty: 7 TABLET | Refills: 0 | Status: SHIPPED | OUTPATIENT
Start: 2019-06-28 | End: 2019-07-05

## 2019-07-08 NOTE — ED NOTES
Family at bedside. Pt looking for lab results from [facility].  Results scanned in chart.  Please advise

## 2019-07-16 DIAGNOSIS — Z86.39 HISTORY OF LOW POTASSIUM: Primary | ICD-10-CM

## 2019-07-25 ENCOUNTER — ROUTINE PRENATAL (OUTPATIENT)
Dept: OBGYN CLINIC | Facility: CLINIC | Age: 24
End: 2019-07-25
Payer: COMMERCIAL

## 2019-07-25 VITALS — BODY MASS INDEX: 24 KG/M2 | SYSTOLIC BLOOD PRESSURE: 94 MMHG | DIASTOLIC BLOOD PRESSURE: 58 MMHG | WEIGHT: 128.38 LBS

## 2019-07-25 DIAGNOSIS — Z34.03 ENCOUNTER FOR SUPERVISION OF NORMAL FIRST PREGNANCY IN THIRD TRIMESTER: Primary | ICD-10-CM

## 2019-07-25 DIAGNOSIS — Z36.9 PRENATAL SCREENING ENCOUNTER: ICD-10-CM

## 2019-07-25 LAB
GLUCOSE (URINE DIPSTICK): NEGATIVE MG/DL
MULTISTIX LOT#: NORMAL NUMERIC
PROTEIN (URINE DIPSTICK): NEGATIVE MG/DL

## 2019-07-25 PROCEDURE — 81002 URINALYSIS NONAUTO W/O SCOPE: CPT | Performed by: NURSE PRACTITIONER

## 2019-07-25 NOTE — PROGRESS NOTES
BETTY  Doing well,  GOOD FM  Denies VB/LOF/uctx  Feeling better, some nausea at times but no further vomiting. Counseled on TDAP, HIV ordered. Advised she complete repeat CMP.   RTC in 2 wks  Fetal movement discussed

## 2019-08-07 ENCOUNTER — ROUTINE PRENATAL (OUTPATIENT)
Dept: OBGYN CLINIC | Facility: CLINIC | Age: 24
End: 2019-08-07
Payer: COMMERCIAL

## 2019-08-07 VITALS
WEIGHT: 131 LBS | HEIGHT: 61 IN | BODY MASS INDEX: 24.73 KG/M2 | DIASTOLIC BLOOD PRESSURE: 48 MMHG | SYSTOLIC BLOOD PRESSURE: 80 MMHG

## 2019-08-07 DIAGNOSIS — Z36.9 PRENATAL SCREENING ENCOUNTER: Primary | ICD-10-CM

## 2019-08-07 DIAGNOSIS — Z23 NEED FOR VACCINATION: ICD-10-CM

## 2019-08-07 PROBLEM — Z34.03 ENCOUNTER FOR SUPERVISION OF NORMAL FIRST PREGNANCY IN THIRD TRIMESTER: Status: ACTIVE | Noted: 2019-05-01

## 2019-08-07 PROCEDURE — 81002 URINALYSIS NONAUTO W/O SCOPE: CPT | Performed by: OBSTETRICS & GYNECOLOGY

## 2019-08-07 PROCEDURE — 90715 TDAP VACCINE 7 YRS/> IM: CPT | Performed by: OBSTETRICS & GYNECOLOGY

## 2019-08-07 PROCEDURE — 90471 IMMUNIZATION ADMIN: CPT | Performed by: OBSTETRICS & GYNECOLOGY

## 2019-08-22 ENCOUNTER — ROUTINE PRENATAL (OUTPATIENT)
Dept: OBGYN CLINIC | Facility: CLINIC | Age: 24
End: 2019-08-22
Payer: COMMERCIAL

## 2019-08-22 VITALS
DIASTOLIC BLOOD PRESSURE: 60 MMHG | WEIGHT: 132.63 LBS | BODY MASS INDEX: 25.04 KG/M2 | HEIGHT: 61 IN | SYSTOLIC BLOOD PRESSURE: 98 MMHG

## 2019-08-22 DIAGNOSIS — Z34.03 ENCOUNTER FOR SUPERVISION OF NORMAL FIRST PREGNANCY IN THIRD TRIMESTER: Primary | ICD-10-CM

## 2019-08-22 DIAGNOSIS — Z36.9 PRENATAL SCREENING ENCOUNTER: ICD-10-CM

## 2019-08-22 LAB
MULTISTIX LOT#: NORMAL NUMERIC
PROTEIN (URINE DIPSTICK): 30 MG/DL

## 2019-08-22 PROCEDURE — 81002 URINALYSIS NONAUTO W/O SCOPE: CPT | Performed by: OBSTETRICS & GYNECOLOGY

## 2019-08-22 NOTE — PROGRESS NOTES
BETTY  Doing well, +FM  Denies LOF/VB/uctx  Rh +, TDAP received. RTC in 2 wks  HIV ordered but pt has not gotten it done.  Advised her to go to lab

## 2019-08-26 ENCOUNTER — TELEPHONE (OUTPATIENT)
Dept: OBGYN CLINIC | Facility: CLINIC | Age: 24
End: 2019-08-26

## 2019-08-26 NOTE — TELEPHONE ENCOUNTER
L/M on pt voicemail to have hiv labs done by next appointment. Pt may call back if she has questions.

## 2019-09-04 ENCOUNTER — APPOINTMENT (OUTPATIENT)
Dept: LAB | Age: 24
End: 2019-09-04
Attending: NURSE PRACTITIONER
Payer: COMMERCIAL

## 2019-09-04 ENCOUNTER — ROUTINE PRENATAL (OUTPATIENT)
Dept: OBGYN CLINIC | Facility: CLINIC | Age: 24
End: 2019-09-04
Payer: COMMERCIAL

## 2019-09-04 VITALS — DIASTOLIC BLOOD PRESSURE: 56 MMHG | SYSTOLIC BLOOD PRESSURE: 90 MMHG | BODY MASS INDEX: 25 KG/M2 | WEIGHT: 133 LBS

## 2019-09-04 DIAGNOSIS — Z34.03 ENCOUNTER FOR SUPERVISION OF NORMAL FIRST PREGNANCY IN THIRD TRIMESTER: Primary | ICD-10-CM

## 2019-09-04 DIAGNOSIS — Z36.89 FIRST TRIMESTER SCREENING FOR TWIN PREGNANCY: ICD-10-CM

## 2019-09-04 DIAGNOSIS — O30.009 FIRST TRIMESTER SCREENING FOR TWIN PREGNANCY: ICD-10-CM

## 2019-09-04 LAB
ALBUMIN SERPL-MCNC: 2.9 G/DL (ref 3.4–5)
ALBUMIN/GLOB SERPL: 0.7 {RATIO} (ref 1–2)
ALP LIVER SERPL-CCNC: 119 U/L (ref 52–144)
ALT SERPL-CCNC: 15 U/L (ref 13–56)
ANION GAP SERPL CALC-SCNC: 8 MMOL/L (ref 0–18)
AST SERPL-CCNC: 16 U/L (ref 15–37)
BILIRUB SERPL-MCNC: 0.2 MG/DL (ref 0.1–2)
BUN BLD-MCNC: 7 MG/DL (ref 7–18)
BUN/CREAT SERPL: 11.3 (ref 10–20)
CALCIUM BLD-MCNC: 8.8 MG/DL (ref 8.5–10.1)
CHLORIDE SERPL-SCNC: 106 MMOL/L (ref 98–112)
CO2 SERPL-SCNC: 23 MMOL/L (ref 21–32)
CREAT BLD-MCNC: 0.62 MG/DL (ref 0.55–1.02)
GLOBULIN PLAS-MCNC: 4 G/DL (ref 2.8–4.4)
GLUCOSE BLD-MCNC: 84 MG/DL (ref 70–99)
M PROTEIN MFR SERPL ELPH: 6.9 G/DL (ref 6.4–8.2)
MULTISTIX EXPIRATION DATE: NORMAL DATE
MULTISTIX LOT#: NORMAL NUMERIC
OSMOLALITY SERPL CALC.SUM OF ELEC: 281 MOSM/KG (ref 275–295)
POTASSIUM SERPL-SCNC: 3.9 MMOL/L (ref 3.5–5.1)
PROTEIN (URINE DIPSTICK): 30 MG/DL
SODIUM SERPL-SCNC: 137 MMOL/L (ref 136–145)

## 2019-09-04 PROCEDURE — 81002 URINALYSIS NONAUTO W/O SCOPE: CPT | Performed by: NURSE PRACTITIONER

## 2019-09-04 NOTE — PROGRESS NOTES
BETTY  Doing well,  GOOD FM  Denies VB/LOF/uctx  HIV recommended  RTC in 2 wks  Fetal movement discussed

## 2019-09-18 ENCOUNTER — ROUTINE PRENATAL (OUTPATIENT)
Dept: OBGYN CLINIC | Facility: CLINIC | Age: 24
End: 2019-09-18
Payer: COMMERCIAL

## 2019-09-18 VITALS — DIASTOLIC BLOOD PRESSURE: 64 MMHG | BODY MASS INDEX: 26 KG/M2 | WEIGHT: 137 LBS | SYSTOLIC BLOOD PRESSURE: 106 MMHG

## 2019-09-18 DIAGNOSIS — Z34.03 ENCOUNTER FOR SUPERVISION OF NORMAL FIRST PREGNANCY IN THIRD TRIMESTER: ICD-10-CM

## 2019-09-18 DIAGNOSIS — Z36.9 PRENATAL SCREENING ENCOUNTER: Primary | ICD-10-CM

## 2019-09-18 LAB
GLUCOSE (URINE DIPSTICK): NEGATIVE MG/DL
MULTISTIX LOT#: NORMAL NUMERIC

## 2019-09-18 PROCEDURE — 81002 URINALYSIS NONAUTO W/O SCOPE: CPT | Performed by: NURSE PRACTITIONER

## 2019-09-18 NOTE — PROGRESS NOTES
BETTY  Doing well, +FM  Denies VB/LOF/uctx  Mode of delivery:  anticipated  Labor precautions discussed  RTC 1 week with GBS

## 2019-09-25 ENCOUNTER — ROUTINE PRENATAL (OUTPATIENT)
Dept: OBGYN CLINIC | Facility: CLINIC | Age: 24
End: 2019-09-25
Payer: COMMERCIAL

## 2019-09-25 VITALS
BODY MASS INDEX: 26.28 KG/M2 | WEIGHT: 139.19 LBS | SYSTOLIC BLOOD PRESSURE: 92 MMHG | DIASTOLIC BLOOD PRESSURE: 58 MMHG | HEIGHT: 61 IN

## 2019-09-25 DIAGNOSIS — Z34.03 ENCOUNTER FOR SUPERVISION OF NORMAL FIRST PREGNANCY IN THIRD TRIMESTER: ICD-10-CM

## 2019-09-25 DIAGNOSIS — Z36.9 PRENATAL SCREENING ENCOUNTER: Primary | ICD-10-CM

## 2019-09-25 LAB — MULTISTIX LOT#: NORMAL NUMERIC

## 2019-09-25 PROCEDURE — 87653 STREP B DNA AMP PROBE: CPT | Performed by: NURSE PRACTITIONER

## 2019-09-25 PROCEDURE — 81002 URINALYSIS NONAUTO W/O SCOPE: CPT | Performed by: NURSE PRACTITIONER

## 2019-09-25 PROCEDURE — 87081 CULTURE SCREEN ONLY: CPT | Performed by: NURSE PRACTITIONER

## 2019-09-25 NOTE — PROGRESS NOTES
BETTY  Doing well, +FM  Denies VB/LOF/uctx  Mode of delivery:  anticipated  Labor precautions discussed  GBS collected   RTC 1 week

## 2019-10-02 ENCOUNTER — ROUTINE PRENATAL (OUTPATIENT)
Dept: OBGYN CLINIC | Facility: CLINIC | Age: 24
End: 2019-10-02
Payer: COMMERCIAL

## 2019-10-02 VITALS — SYSTOLIC BLOOD PRESSURE: 104 MMHG | BODY MASS INDEX: 27 KG/M2 | WEIGHT: 140.81 LBS | DIASTOLIC BLOOD PRESSURE: 62 MMHG

## 2019-10-02 DIAGNOSIS — Z36.9 PRENATAL SCREENING ENCOUNTER: Primary | ICD-10-CM

## 2019-10-02 PROCEDURE — 81002 URINALYSIS NONAUTO W/O SCOPE: CPT | Performed by: OBSTETRICS & GYNECOLOGY

## 2019-10-02 NOTE — PROGRESS NOTES
BETTY  Doing well, +FM  No contractions  No LOF, VB    SVE: deferred    Patient Active Problem List:     Hypokalemia     Metabolic acidosis     Hyperemesis gravidarum     12 weeks gestation of pregnancy     Major depressive disorder, single episode, severe (

## 2019-10-10 ENCOUNTER — ROUTINE PRENATAL (OUTPATIENT)
Dept: OBGYN CLINIC | Facility: CLINIC | Age: 24
End: 2019-10-10
Payer: COMMERCIAL

## 2019-10-10 VITALS — SYSTOLIC BLOOD PRESSURE: 114 MMHG | DIASTOLIC BLOOD PRESSURE: 70 MMHG | WEIGHT: 143 LBS | BODY MASS INDEX: 27 KG/M2

## 2019-10-10 DIAGNOSIS — Z3A.38 38 WEEKS GESTATION OF PREGNANCY: Primary | ICD-10-CM

## 2019-10-10 DIAGNOSIS — Z34.03 ENCOUNTER FOR SUPERVISION OF NORMAL FIRST PREGNANCY IN THIRD TRIMESTER: ICD-10-CM

## 2019-10-10 PROCEDURE — 81002 URINALYSIS NONAUTO W/O SCOPE: CPT | Performed by: OBSTETRICS & GYNECOLOGY

## 2019-10-10 NOTE — PROGRESS NOTES
BETTY  Doing well, +FM  Denies VB/LOF/uctx  Mode of delivery:   anticipated  SVE  2-3/80/-2, vtx  GBS NEG  RTC 1 week  Labor instructions

## 2019-10-10 NOTE — PATIENT INSTRUCTIONS
Labor Instructions    How do I know if it’s true labor? • One of the most important aspects of any pregnancy is being able to recognize the onset of labor.   Unfortunately, on occasion it can be difficult or confusing, especially if you have had one or mor of the contractions. Having regular (usually closer), longer lasting (35-70 seconds), and sharper (more painful) contractions are the common symptoms of actual labor.   The second way in which labor can begin which occurs in approximately 30% of all patien care.  The various options may also have been discussed in Prenatal Classes. We utilize IV narcotics and epidural anesthesia when our patients request to have them. If you chose to have no anesthesia, none will be administered.   A local anesthetic may be you should continue your prenatal vitamins. If you do not breastfeed, simply finish the prenatal vitamins you have. The staff at [facility] wish you a joyous and exciting birth.   If there is anything we can do to make this a better e

## 2019-11-30 ENCOUNTER — POSTPARTUM (OUTPATIENT)
Dept: OBGYN CLINIC | Facility: CLINIC | Age: 24
End: 2019-11-30
Payer: COMMERCIAL

## 2019-11-30 VITALS
DIASTOLIC BLOOD PRESSURE: 68 MMHG | HEIGHT: 61 IN | SYSTOLIC BLOOD PRESSURE: 112 MMHG | BODY MASS INDEX: 24.55 KG/M2 | WEIGHT: 130 LBS

## 2019-11-30 DIAGNOSIS — Z30.430 ENCOUNTER FOR INSERTION OF INTRAUTERINE CONTRACEPTIVE DEVICE: ICD-10-CM

## 2019-11-30 PROBLEM — E87.20 METABOLIC ACIDOSIS: Status: RESOLVED | Noted: 2019-04-03 | Resolved: 2019-11-30

## 2019-11-30 PROBLEM — E87.2 METABOLIC ACIDOSIS: Status: RESOLVED | Noted: 2019-04-03 | Resolved: 2019-11-30

## 2019-11-30 PROBLEM — O21.0 HYPEREMESIS GRAVIDARUM: Status: RESOLVED | Noted: 2019-04-03 | Resolved: 2019-11-30

## 2019-11-30 PROBLEM — Z34.03 ENCOUNTER FOR SUPERVISION OF NORMAL FIRST PREGNANCY IN THIRD TRIMESTER: Status: RESOLVED | Noted: 2019-05-01 | Resolved: 2019-10-11

## 2019-11-30 PROCEDURE — 58300 INSERT INTRAUTERINE DEVICE: CPT | Performed by: OBSTETRICS & GYNECOLOGY

## 2019-11-30 PROCEDURE — 99212 OFFICE O/P EST SF 10 MIN: CPT | Performed by: OBSTETRICS & GYNECOLOGY

## 2019-11-30 NOTE — PROGRESS NOTES
Post partum visit  And requests Mirena  She delivered at [facility] after SROM  No records   10/11/19 of 7 lb 13 of male infant.  No problems according to patient, she feels well  Not nursing, has not been sexually active    ROS: No Cardiac, R

## 2020-04-24 ENCOUNTER — OFFICE VISIT (OUTPATIENT)
Dept: OBGYN CLINIC | Facility: CLINIC | Age: 25
End: 2020-04-24
Payer: COMMERCIAL

## 2020-04-24 VITALS
SYSTOLIC BLOOD PRESSURE: 108 MMHG | BODY MASS INDEX: 27.94 KG/M2 | TEMPERATURE: 99 F | DIASTOLIC BLOOD PRESSURE: 72 MMHG | WEIGHT: 148 LBS | HEIGHT: 61 IN

## 2020-04-24 DIAGNOSIS — Z30.432 ENCOUNTER FOR REMOVAL OF INTRAUTERINE CONTRACEPTIVE DEVICE (IUD): Primary | ICD-10-CM

## 2020-04-24 PROCEDURE — 58301 REMOVE INTRAUTERINE DEVICE: CPT | Performed by: OBSTETRICS & GYNECOLOGY

## 2020-04-24 RX ORDER — NORETHINDRONE ACETATE AND ETHINYL ESTRADIOL AND FERROUS FUMARATE 1MG-20(24)
1 KIT ORAL DAILY
Qty: 3 PACKAGE | Refills: 3 | Status: SHIPPED | OUTPATIENT
Start: 2020-04-24 | End: 2020-05-22

## 2020-04-24 NOTE — PROGRESS NOTES
Procedure Note: IUD removal     Preoperative Diagnosis:  IUD in place     Postoperative Diagnosis:  S/p IUD removal     Indications:  26year old y/o  female with mirena IUD in placed since 2019 who presents for IUD removal per patient request.

## 2020-08-17 ENCOUNTER — E-VISIT (OUTPATIENT)
Dept: TELEHEALTH | Age: 25
End: 2020-08-17

## 2020-08-17 ENCOUNTER — TELEPHONE (OUTPATIENT)
Dept: TELEHEALTH | Age: 25
End: 2020-08-17

## 2020-08-17 DIAGNOSIS — L30.9 DERMATITIS: Primary | ICD-10-CM

## 2020-08-17 DIAGNOSIS — Z09 FOLLOW UP: Primary | ICD-10-CM

## 2020-08-17 PROCEDURE — 99421 OL DIG E/M SVC 5-10 MIN: CPT | Performed by: NURSE PRACTITIONER

## 2020-08-19 NOTE — PATIENT INSTRUCTIONS
Understanding Contact Dermatitis    Contact dermatitis is a common type of skin rash. It’s caused by something that touches the skin and makes it irritated and inflamed.  It can occur on skin on any part of the body, such as the face, neck, hands, arms, a · Cool, moist compress. Use a clean damp cloth. Put it on the area for 20 to 30 minutes, 5 to 6 times a day for the first 3 days. · Steroid cream or ointment. You can apply this medicine several times a day on clean skin. · Oral corticosteroid.  Your heal © 0701-9006 [employer] [address] [facility], [facility] [place]. All rights reserved. This information is not intended as a substitute for professional medical care. Always follow your healthcare professional's instructions.         Hydroco Talk to your pediatrician regarding the use of this medicine in children. Special care may be needed. While this drug may be prescribed for children as young as 3years of age for selected conditions, precautions do apply.  Do not use this medicine for the Store at room temperature between 15 and 30 degrees C (59 and 86 degrees F). Do not freeze. Throw away any unused medicine after the expiration date. What should I tell my health care provider before I take this medicine?   They need to know if you have an

## 2020-08-19 NOTE — PROGRESS NOTES
Alfreda Covarrubias is a 26year old female. HPI:   See answers to questions above. Current Outpatient Medications   Medication Sig Dispense Refill   • IRON OR      • Docusate Sodium (COLACE OR) Take by mouth.      • PRENATAL 27-0.8 MG Oral Tab Take 1

## 2020-08-30 ENCOUNTER — E-VISIT (OUTPATIENT)
Dept: TELEHEALTH | Age: 25
End: 2020-08-30

## 2020-08-30 DIAGNOSIS — L30.9 ECZEMA, UNSPECIFIED TYPE: Primary | ICD-10-CM

## 2020-08-30 PROCEDURE — 99422 OL DIG E/M SVC 11-20 MIN: CPT | Performed by: NURSE PRACTITIONER

## 2020-08-31 RX ORDER — TRIAMCINOLONE ACETONIDE 5 MG/G
1 CREAM TOPICAL 2 TIMES DAILY
Qty: 1 TUBE | Refills: 0 | Status: SHIPPED | OUTPATIENT
Start: 2020-08-31 | End: 2021-01-11 | Stop reason: ALTCHOICE

## 2020-08-31 NOTE — PROGRESS NOTES
Theresa Son is a 26year old female. HPI:   See answers to questions above. Current Outpatient Medications   Medication Sig Dispense Refill   • triamcinolone acetonide 0.5 % External Cream Apply 1 Application topically 2 (two) times daily.  1

## 2020-08-31 NOTE — PATIENT INSTRUCTIONS
Managing Atopic Dermatitis (Eczema)     After bathing, gently pat your skin dry (don’t rub). Apply moisturizer while your skin is still damp.    To manage your symptoms and help reduce the severity and frequency, try these self-care tips:   Caring for you Now that you know more about atopic dermatitis, the next step is up to you. Follow your healthcare provider’s treatment plan and your self-care routine. This will help bring atopic dermatitis under control.  If your symptoms persist, be sure to let your hea To figure out what causes atopic dermatitis to flare, keep a list of things that seem to affect your skin. Start by filling in the spaces below. Then keep writing them down in a notebook or diary. The things that affect each person vary.  So keep your own l

## 2020-12-23 ENCOUNTER — E-VISIT (OUTPATIENT)
Dept: TELEHEALTH | Age: 25
End: 2020-12-23

## 2020-12-23 DIAGNOSIS — Z02.9 ENCOUNTERS FOR ADMINISTRATIVE PURPOSE: Primary | ICD-10-CM

## 2021-01-11 ENCOUNTER — OFFICE VISIT (OUTPATIENT)
Dept: FAMILY MEDICINE CLINIC | Facility: CLINIC | Age: 26
End: 2021-01-11
Payer: COMMERCIAL

## 2021-01-11 VITALS
OXYGEN SATURATION: 98 % | DIASTOLIC BLOOD PRESSURE: 76 MMHG | WEIGHT: 167 LBS | SYSTOLIC BLOOD PRESSURE: 120 MMHG | HEART RATE: 78 BPM | HEIGHT: 61 IN | TEMPERATURE: 98 F | BODY MASS INDEX: 31.53 KG/M2

## 2021-01-11 DIAGNOSIS — Z00.00 ROUTINE GENERAL MEDICAL EXAMINATION AT A HEALTH CARE FACILITY: Primary | ICD-10-CM

## 2021-01-11 DIAGNOSIS — R63.5 WEIGHT GAIN: ICD-10-CM

## 2021-01-11 DIAGNOSIS — F43.9 STRESS: ICD-10-CM

## 2021-01-11 DIAGNOSIS — Z11.8 SCREENING FOR CHLAMYDIAL DISEASE: ICD-10-CM

## 2021-01-11 DIAGNOSIS — F41.9 ANXIETY: ICD-10-CM

## 2021-01-11 DIAGNOSIS — Z23 IMMUNIZATION DUE: ICD-10-CM

## 2021-01-11 DIAGNOSIS — R53.82 CHRONIC FATIGUE: ICD-10-CM

## 2021-01-11 PROCEDURE — 3074F SYST BP LT 130 MM HG: CPT | Performed by: FAMILY MEDICINE

## 2021-01-11 PROCEDURE — 3008F BODY MASS INDEX DOCD: CPT | Performed by: FAMILY MEDICINE

## 2021-01-11 PROCEDURE — 99214 OFFICE O/P EST MOD 30 MIN: CPT | Performed by: FAMILY MEDICINE

## 2021-01-11 PROCEDURE — 90471 IMMUNIZATION ADMIN: CPT | Performed by: FAMILY MEDICINE

## 2021-01-11 PROCEDURE — 3078F DIAST BP <80 MM HG: CPT | Performed by: FAMILY MEDICINE

## 2021-01-11 PROCEDURE — 99395 PREV VISIT EST AGE 18-39: CPT | Performed by: FAMILY MEDICINE

## 2021-01-11 PROCEDURE — 90651 9VHPV VACCINE 2/3 DOSE IM: CPT | Performed by: FAMILY MEDICINE

## 2021-01-11 NOTE — PROGRESS NOTES
Pam Madsen is a 26year old female who is here for Patient presents with:  Wellness Visit: Feeling tired and fatigued over the last 6 months      HPI:     1. Routine general medical examination at a health care facility  2.  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 No current facility-administered medications on file prior to visit.          REVIEW OF physical, if any):  Eczematous rash in b/l wrists    The ASCVD Risk score (Aisha Truong, et al., 2013) failed to calculate for the following reasons:     The 2013 ASCVD risk score is only valid for ages 36 to 78    ASSESSMENT AND PLAN:     Health Maintenance

## 2021-01-11 NOTE — PATIENT INSTRUCTIONS
Increase fruits and veggies  Less carbs, processed foods  Increase exercise     will call with counseling options    Go to [facility] for fasting bloodwork    Cream as needed for rash    Followup in 4 wks, sooner if needed

## 2021-01-19 LAB
ABSOLUTE BASOPHILS: 54 CELLS/UL (ref 0–200)
ABSOLUTE EOSINOPHILS: 169 CELLS/UL (ref 15–500)
ABSOLUTE LYMPHOCYTES: 2364 CELLS/UL (ref 850–3900)
ABSOLUTE MONOCYTES: 501 CELLS/UL (ref 200–950)
ABSOLUTE NEUTROPHILS: 4612 CELLS/UL (ref 1500–7800)
ALBUMIN/GLOBULIN RATIO: 1.4 (CALC) (ref 1–2.5)
ALBUMIN: 4.1 G/DL (ref 3.6–5.1)
ALKALINE PHOSPHATASE: 66 U/L (ref 31–125)
ALT: 27 U/L (ref 6–29)
AST: 19 U/L (ref 10–30)
BASOPHILS: 0.7 %
BILIRUBIN, TOTAL: 0.4 MG/DL (ref 0.2–1.2)
BUN: 11 MG/DL (ref 7–25)
CALCIUM: 9.3 MG/DL (ref 8.6–10.2)
CARBON DIOXIDE: 23 MMOL/L (ref 20–32)
CHLAMYDIA TRACHOMATIS$RNA, TMA: NOT DETECTED
CHLORIDE: 103 MMOL/L (ref 98–110)
CHOL/HDLC RATIO: 2.2 (CALC)
CHOLESTEROL, TOTAL: 168 MG/DL
CREATININE: 0.71 MG/DL (ref 0.5–1.1)
EGFR IF AFRICN AM: 138 ML/MIN/1.73M2
EGFR IF NONAFRICN AM: 119 ML/MIN/1.73M2
EOSINOPHILS: 2.2 %
GLOBULIN: 3 G/DL (CALC) (ref 1.9–3.7)
GLUCOSE: 79 MG/DL (ref 65–99)
HDL CHOLESTEROL: 75 MG/DL
HEMATOCRIT: 40.2 % (ref 35–45)
HEMOGLOBIN: 13.5 G/DL (ref 11.7–15.5)
LDL-CHOLESTEROL: 71 MG/DL (CALC)
LYMPHOCYTES: 30.7 %
MCH: 31.3 PG (ref 27–33)
MCHC: 33.6 G/DL (ref 32–36)
MCV: 93.3 FL (ref 80–100)
MONOCYTES: 6.5 %
MPV: 10.5 FL (ref 7.5–12.5)
NEISSERIA GONORRHOEAE$RNA, TMA: NOT DETECTED
NEUTROPHILS: 59.9 %
NON-HDL CHOLESTEROL: 93 MG/DL (CALC)
PLATELET COUNT: 258 THOUSAND/UL (ref 140–400)
POTASSIUM: 3.8 MMOL/L (ref 3.5–5.3)
PROTEIN, TOTAL: 7.1 G/DL (ref 6.1–8.1)
RDW: 12 % (ref 11–15)
RED BLOOD CELL COUNT: 4.31 MILLION/UL (ref 3.8–5.1)
SODIUM: 136 MMOL/L (ref 135–146)
TRIGLYCERIDES: 137 MG/DL
TSH W/REFLEX TO FT4: 2.56 MIU/L
WHITE BLOOD CELL COUNT: 7.7 THOUSAND/UL (ref 3.8–10.8)

## 2021-02-09 NOTE — PROGRESS NOTES
Carrie Whitt is a 26year old female here for Patient presents with:  Fatigue: Follow up, patient has not noticed any change. Rash: The rash is worse. HPI:       1. Depression with anxiety  2.  Chronic fatigue  -a little better in terms of star alert and oriented x 3  HEENT: NCAT, pupils equal and round  Pulm: CTAB, no wheezing  CV: RRR, no murmurs  Ext: full ROM  Psych: normal affect  Skin: erythematous papular rash in b/l intertriginous areas of arms     ASSESSMENT/PLAN:     1.  Depression with

## 2021-02-23 ENCOUNTER — E-VISIT (OUTPATIENT)
Dept: TELEHEALTH | Age: 26
End: 2021-02-23

## 2021-02-23 DIAGNOSIS — Z02.9 ENCOUNTERS FOR UNSPECIFIED ADMINISTRATIVE PURPOSE: Primary | ICD-10-CM

## 2021-02-24 NOTE — PROGRESS NOTES
Julio C Lazo is a 26year old female. HPI:   See answers to questions above. Current Outpatient Medications   Medication Sig Dispense Refill   • Clobetasol Propionate 0.05 % External Cream Apply 1 Application topically 2 (two) times daily.  30 g

## 2021-03-13 DIAGNOSIS — Z23 NEED FOR VACCINATION: ICD-10-CM

## 2021-06-17 ENCOUNTER — TELEPHONE (OUTPATIENT)
Dept: FAMILY MEDICINE CLINIC | Facility: CLINIC | Age: 26
End: 2021-06-17

## 2021-06-17 NOTE — TELEPHONE ENCOUNTER
[facility] and pressure on chest.  She made the appt for 7/28/21.

## 2021-06-17 NOTE — TELEPHONE ENCOUNTER
Second message left for patient to call back. Will offer sooner appointment with Dr. Macrina Hernandez after triaging symptoms.    Also sent mychart message

## 2022-03-16 NOTE — TELEPHONE ENCOUNTER
----- Message from Nancy Max MD sent at 12/27/2018  3:28 PM CST -----  Vit D very low - Vit D 50,000 units weekly x 12 wks, then recheck Vit D (she may need longer repletion    Potassium mildly low - eat more potassium rich food like bananas, avocado
Spoke to patient with results/instructions and pt verbalized understanding.   Med sent to pharmacy and lab in system for 3 months
Strong peripheral pulses

## 2022-04-21 ENCOUNTER — TELEPHONE (OUTPATIENT)
Dept: FAMILY MEDICINE CLINIC | Facility: CLINIC | Age: 27
End: 2022-04-21

## 2022-04-21 NOTE — TELEPHONE ENCOUNTER
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

## 2022-04-25 NOTE — PATIENT INSTRUCTIONS
Drink at least 64 ounces of water daily. Eat small, frequent meals throughout the day so your blood sugar does not drop. Limit caffeine to one serving daily. Be careful with change in position so you do not fall.     Make an appointment with Dr. Soila Al in one month for your annual physical.

## 2023-03-08 ENCOUNTER — PATIENT OUTREACH (OUTPATIENT)
Dept: FAMILY MEDICINE CLINIC | Facility: CLINIC | Age: 28
End: 2023-03-08

## 2023-03-08 NOTE — PROGRESS NOTES
Patient is due for Wellness Visit. Last seen 02/09/2021. Left message for patient to call office. Patient needs appointment. Letter sent.

## (undated) NOTE — LETTER
[address], :1995    CONSENT FOR PROCEDURE/SEDATION    1. I authorize the performance upon [address]  the following: IUD Removal only     2.  I authorize Dr. Sabrina Pool MD (and whomever is designated as the [employer] Witness: _________________________________________ Date:___________     Physician Signature: _______________________________ Date:___________

## (undated) NOTE — LETTER
04/26/19        Janes Guthrie  [address] [address]  [place] [address]      Dear Osorio Nava,    [facility] records indicate that you have outstanding lab work and or testing that was ordered for you and has not yet been completed:  Orders Placed This Encounter

## (undated) NOTE — ED AVS SNAPSHOT
Jason Young   MRN: [id number]    Department:  [facility] Emergency Department   Date of Visit:  8/13/2017           Disclosure     Insurance plans vary and the physician(s) referred by the ER may not be covered by your plan.  Please contact your i If you have been prescribed any medication(s), please fill your prescription right away and begin taking the medication(s) as directed    If the emergency physician has read X-rays, these will be re-interpreted by a radiologist.  If there is a significant

## (undated) NOTE — LETTER
03/08/23              Amanda Martinez [address] [facility] [address] [address]                     In our system Dr. Christine Fried is shown to be your primary care provider, you are due for an Annual Wellness Visit. Can you please call our office to schedule appointment. If you are being evaluated by another provider, would you so graciously call our office at [phone] to inform our office of this change so we can update our records.        Thank you for your time,       [address]

## (undated) NOTE — ED AVS SNAPSHOT
Talia Rivera   MRN: [id number]    Department:  [facility] Emergency Department   Date of Visit:  4/1/2019           Disclosure     Insurance plans vary and the physician(s) referred by the ER may not be covered by your plan.  Please contact your in tell this physician (or your personal doctor if your instructions are to return to your personal doctor) about any new or lasting problems. The primary care or specialist physician will see patients referred from the [facility] Emergency Department.  Cheryle Levins

## (undated) NOTE — LETTER
August 13, 2017    Patient: Jason Young   Date of Visit: 8/13/2017       To Whom It May Concern:    Jason Young was seen and treated in our emergency department on 8/13/2017. She {Return to school/sport/work:[id number]}.     If you have any questions

## (undated) NOTE — LETTER
[address], :1995    CONSENT FOR PROCEDURE/SEDATION    1. I authorize the performance upon [address]  the following: Intrauterine Device Mirena    2.  I authorize Dr. Phil Stokes MD (and whomever is designated as the doctor’s ass Relationship to patient: ____________________________________________    Witness: _________________________________________ Date:___________     Physician Signature: _______________________________ Date:___________

## (undated) NOTE — LETTER
Date: 4/25/2022    Patient Name: Manda Otto          To Whom it may concern: This letter has been written at the patient's request. The above patient was seen at the [facility] for treatment of a medical condition. 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 No working from heights. She should stay at ground level when working.       Sincerely,          Ayush Stratton, DO

## (undated) NOTE — LETTER
Tiffanie Uriostegui [address] [facility] [address] [facility]     PICC LINE INSERTION CONSENT     I agree to have a Peripherally Inserted Central Catheter (PICC) placed in my arm.    1. The PICC insertion procedure, care, maintenance, risks, benefits, and c also discussed reasonable alternatives to the PICC, including risks, benefits, and side effects related to the alternatives and risks related to not receiving this procedure      _____________________ ___________ ____________________________________   Date